# Patient Record
Sex: FEMALE | Race: WHITE | NOT HISPANIC OR LATINO | Employment: OTHER | URBAN - METROPOLITAN AREA
[De-identification: names, ages, dates, MRNs, and addresses within clinical notes are randomized per-mention and may not be internally consistent; named-entity substitution may affect disease eponyms.]

---

## 2017-03-02 DIAGNOSIS — IMO0002 UNCONTROLLED TYPE 2 DIABETES MELLITUS WITH STAGE 3 CHRONIC KIDNEY DISEASE, WITH LONG-TERM CURRENT USE OF INSULIN: Primary | ICD-10-CM

## 2017-03-15 ENCOUNTER — OFFICE VISIT (OUTPATIENT)
Dept: ENDOCRINOLOGY | Facility: CLINIC | Age: 62
End: 2017-03-15

## 2017-03-15 VITALS
HEIGHT: 63 IN | WEIGHT: 266.2 LBS | HEART RATE: 82 BPM | DIASTOLIC BLOOD PRESSURE: 82 MMHG | BODY MASS INDEX: 47.17 KG/M2 | OXYGEN SATURATION: 100 % | SYSTOLIC BLOOD PRESSURE: 130 MMHG

## 2017-03-15 DIAGNOSIS — IMO0002 UNCONTROLLED TYPE 2 DIABETES MELLITUS WITH STAGE 3 CHRONIC KIDNEY DISEASE, WITH LONG-TERM CURRENT USE OF INSULIN: Primary | ICD-10-CM

## 2017-03-15 DIAGNOSIS — E11.43 GASTROPARESIS DUE TO DM (HCC): ICD-10-CM

## 2017-03-15 DIAGNOSIS — K31.84 GASTROPARESIS DUE TO DM (HCC): ICD-10-CM

## 2017-03-15 DIAGNOSIS — I10 ESSENTIAL HYPERTENSION: ICD-10-CM

## 2017-03-15 DIAGNOSIS — E78.2 MIXED HYPERLIPIDEMIA: ICD-10-CM

## 2017-03-15 LAB
GLUCOSE BLDC GLUCOMTR-MCNC: 132 MG/DL (ref 70–130)
HBA1C MFR BLD: 9.9 %

## 2017-03-15 PROCEDURE — 83036 HEMOGLOBIN GLYCOSYLATED A1C: CPT | Performed by: INTERNAL MEDICINE

## 2017-03-15 PROCEDURE — 99214 OFFICE O/P EST MOD 30 MIN: CPT | Performed by: INTERNAL MEDICINE

## 2017-03-15 PROCEDURE — 82947 ASSAY GLUCOSE BLOOD QUANT: CPT | Performed by: INTERNAL MEDICINE

## 2017-03-15 NOTE — PROGRESS NOTES
Chief Complaint   Patient presents with   • Diabetes     Follow Up         HPI:   Masha Forde is a 62 y.o.female who returns to [facility] for f/u evaluation of her Type 2 DM. Last clinic visit 12/19/2016. Her history is as follows:    Interim Events: pt's diagnosis of gastroparesis confirmed by Dr. Maryuri Mauricio ([facility]). Was prescribed Domperidone TID, but just started medication yesterday due to difficulties obtaining medication from [facility].    1) Type 2 diabetes with associated complications of CKD, gastroparesis:  - diagnosed with DM in her mid 40's     Current DM Medications:  - Novolog 70/30: 40 units ac BK and 40 units ac dinner  - Metformin  mg BID (higher dose caused diarrhea)    Glucometer Review: pt forgot to bring meter today  She reports:  220-240 in Am  260 - 300 in PM.    DM Health Maintenance:  Ophtho: Last visit - 9/2016 - normal  Podiatry: Last visit - none  Monofilament: normal in 2016  Lipids: 2016 - need to obtain from her PCP office, on lipitor 10 mg daily  Urine Microalb/Cr ratio: has CKD stage 3, on an ARB  Aspirin: N/A    Review of Systems   Constitutional: Positive for fatigue.        Weight gain   HENT: Negative.    Eyes: Negative.  Negative for visual disturbance.   Respiratory: Positive for shortness of breath (on exertion).    Cardiovascular: Positive for leg swelling. Negative for chest pain.        Bilateral venous stasis to lower extremities   Gastrointestinal: Positive for nausea. Negative for constipation and diarrhea.   Endocrine: Negative.  Negative for cold intolerance, polydipsia and polyuria.   Genitourinary: Negative.    Musculoskeletal: Negative.    Skin: Negative.  Negative for rash and wound.   Neurological: Negative.  Negative for numbness and headaches.   Hematological: Negative.    Psychiatric/Behavioral:        Anxiety     The following portions of the patient's history were reviewed and updated as appropriate: allergies, current medications, past  "family history, past medical history, past social history, past surgical history and problem list.    Visit Vitals   • /82   • Pulse 82   • Ht 63\" (160 cm)   • Wt 266 lb 3.2 oz (121 kg)   • SpO2 100%   • BMI 47.16 kg/m2     Physical Exam   Constitutional: She is oriented to person, place, and time. She appears well-developed. No distress.   obese   HENT:   Head: Normocephalic.   Mouth/Throat: Oropharynx is clear and moist.   Eyes: Conjunctivae and EOM are normal. Pupils are equal, round, and reactive to light.   Neck: No tracheal deviation present. No thyromegaly present.   No palpable thyroid nodules     Cardiovascular: Normal rate, regular rhythm and normal heart sounds.    No murmur heard.  Pulmonary/Chest: Effort normal and breath sounds normal. No respiratory distress.   Abdominal: Soft. Bowel sounds are normal.   Lymphadenopathy:     She has no cervical adenopathy.   Neurological: She is alert and oriented to person, place, and time. No cranial nerve deficit.   Skin: Skin is warm and dry. She is not diaphoretic. No erythema.   Psychiatric: She has a normal mood and affect. Her behavior is normal.   Vitals reviewed.    LABS/IMAGING:   Results for orders placed or performed in visit on 03/15/17   POC Glucose Fingerstick   Result Value Ref Range    Glucose 132 (A) 70 - 130 mg/dL   POC Glycosylated Hemoglobin (Hb A1C)   Result Value Ref Range    Hemoglobin A1C 9.9 %     ASSESSMENT/PLAN:  1) Type 2 Diabetes with associated complication of CKD stage 3: uncontrolled, Hgb A1C% 9.9   - continue metformin  mg BID in setting of mod CKD. (new FDA recommendation)  - will increase Novolog 70/30 premixed insulin to 50 units AC BK and ac dinner. Pt to titrate slowly until pre-meal BGs are < 150. Written instructions given to patient.  - pt to email BG log in 2 weeks    2) gastroparesis due to DM:   - managed by Dr. Maryuri Mauricio, [facility]  - on domperidone TID    3) essential HTN: controlled  - managed by PCP   - " on valsartan 40 mg daily    4) mixed hyperlipidemia:  - managed by PCP  - on lipitor 10 mg daily    Will obtain recent labs from PCP office for review    RTC 3 months

## 2017-03-19 PROBLEM — K31.84 GASTROPARESIS DUE TO DM (HCC): Status: ACTIVE | Noted: 2017-03-19

## 2017-03-19 PROBLEM — E11.43 GASTROPARESIS DUE TO DM (HCC): Status: ACTIVE | Noted: 2017-03-19

## 2017-03-19 RX ORDER — METFORMIN HYDROCHLORIDE 500 MG/1
500 TABLET, EXTENDED RELEASE ORAL 2 TIMES DAILY
Qty: 180 TABLET | Refills: 3 | Status: SHIPPED | OUTPATIENT
Start: 2017-03-19 | End: 2018-04-16 | Stop reason: SDUPTHER

## 2017-03-19 RX ORDER — INSULIN ASPART 100 [IU]/ML
INJECTION, SUSPENSION SUBCUTANEOUS
Qty: 35 PEN | Refills: 3 | Status: SHIPPED | OUTPATIENT
Start: 2017-03-19 | End: 2017-07-19 | Stop reason: SDUPTHER

## 2017-07-19 ENCOUNTER — OFFICE VISIT (OUTPATIENT)
Dept: ENDOCRINOLOGY | Facility: CLINIC | Age: 62
End: 2017-07-19

## 2017-07-19 VITALS
SYSTOLIC BLOOD PRESSURE: 128 MMHG | OXYGEN SATURATION: 98 % | HEART RATE: 86 BPM | WEIGHT: 285.1 LBS | HEIGHT: 63 IN | DIASTOLIC BLOOD PRESSURE: 80 MMHG | BODY MASS INDEX: 50.52 KG/M2

## 2017-07-19 DIAGNOSIS — E11.43 GASTROPARESIS DUE TO DM (HCC): ICD-10-CM

## 2017-07-19 DIAGNOSIS — E78.2 MIXED HYPERLIPIDEMIA: ICD-10-CM

## 2017-07-19 DIAGNOSIS — K31.84 GASTROPARESIS DUE TO DM (HCC): ICD-10-CM

## 2017-07-19 DIAGNOSIS — I10 ESSENTIAL HYPERTENSION: ICD-10-CM

## 2017-07-19 DIAGNOSIS — IMO0002 UNCONTROLLED TYPE 2 DIABETES MELLITUS WITH STAGE 3 CHRONIC KIDNEY DISEASE, WITH LONG-TERM CURRENT USE OF INSULIN: Primary | ICD-10-CM

## 2017-07-19 LAB
GLUCOSE BLDC GLUCOMTR-MCNC: 184 MG/DL (ref 70–130)
HBA1C MFR BLD: 7.8 %

## 2017-07-19 PROCEDURE — 82947 ASSAY GLUCOSE BLOOD QUANT: CPT | Performed by: INTERNAL MEDICINE

## 2017-07-19 PROCEDURE — 99214 OFFICE O/P EST MOD 30 MIN: CPT | Performed by: INTERNAL MEDICINE

## 2017-07-19 PROCEDURE — 83036 HEMOGLOBIN GLYCOSYLATED A1C: CPT | Performed by: INTERNAL MEDICINE

## 2017-07-19 RX ORDER — INSULIN ASPART 100 [IU]/ML
INJECTION, SUSPENSION SUBCUTANEOUS
Qty: 35 PEN | Refills: 3 | Status: SHIPPED | OUTPATIENT
Start: 2017-07-19 | End: 2017-11-20 | Stop reason: SDUPTHER

## 2017-07-19 NOTE — PROGRESS NOTES
Chief Complaint   Patient presents with   • Diabetes     Follow UP       HPI:   Masha Forde is a 62 y.o.female who returns to [facility] for f/u evaluation of her Type 2 DM. Last clinic visit 03/15/2017. Her history is as follows:    Interim Events:   - having more hypoglycemia after breakfast while at work. Tends to eat a lower carb/light breakfast and a larger lunch. Having AM BGs < 100 frequently  - stopped domperidone due to fear of side effect.     1) Type 2 diabetes with associated complications of CKD, gastroparesis:  - diagnosed with DM in her mid 40's     Current DM Medications:  - Novolog 70/30: 58 units ac BK and 58 units ac dinner  - Metformin  mg BID (higher dose caused diarrhea)    Glucometer Review:   AM BGs: 80's - 180's  PM BGs (taken after meals): 100's -200's    DM Health Maintenance:  Ophtho: Last visit - 9/2016 - normal  Podiatry: Last visit - none  Monofilament: normal in 2016  Lipids: due 2017, on lipitor 10 mg daily  Urine Microalb/Cr ratio: N/A has CKD stage 3, on an ARB  Aspirin: N/A    Review of Systems   Constitutional:        Weight gain   HENT: Negative.    Eyes: Negative.  Negative for visual disturbance.   Respiratory: Positive for shortness of breath (on exertion).    Cardiovascular: Positive for leg swelling. Negative for chest pain.        Bilateral venous stasis to lower extremities   Gastrointestinal: Positive for nausea. Negative for constipation, diarrhea and vomiting.   Endocrine: Negative.  Negative for cold intolerance, polydipsia and polyuria.   Genitourinary: Negative.    Musculoskeletal:        Small, nontender nodules present in distal pats of her fingers due to her GA   Skin: Negative.  Negative for rash and wound.   Neurological: Negative.  Negative for numbness and headaches.   Hematological: Negative.    Psychiatric/Behavioral:        H/o Anxiety     The following portions of the patient's history were reviewed and updated as appropriate: allergies,  "current medications, past family history, past medical history, past social history, past surgical history and problem list.    /80  Pulse 86  Ht 63\" (160 cm)  Wt 285 lb 1.6 oz (129 kg)  SpO2 98%  BMI 50.5 kg/m2  Physical Exam   Constitutional: She is oriented to person, place, and time. She appears well-developed. No distress.   obese   HENT:   Head: Normocephalic.   Eyes: Conjunctivae and EOM are normal. Pupils are equal, round, and reactive to light.   Neck: No tracheal deviation present. No thyromegaly present.   No palpable thyroid nodules     Cardiovascular: Normal rate, regular rhythm and normal heart sounds.    No murmur heard.  Pulmonary/Chest: Effort normal and breath sounds normal. No respiratory distress.   Abdominal: Soft. Bowel sounds are normal.   No scarring at insulin injection sites     Musculoskeletal: She exhibits edema (+1 lower extremity edema).   Lymphadenopathy:     She has no cervical adenopathy.   Neurological: She is alert and oriented to person, place, and time. No cranial nerve deficit.   Skin: Skin is warm and dry. She is not diaphoretic. No erythema.   Psychiatric: She has a normal mood and affect. Her behavior is normal.   Vitals reviewed.    LABS/IMAGING:   Results for orders placed or performed in visit on 07/19/17   POC Glucose Fingerstick   Result Value Ref Range    Glucose 184 (A) 70 - 130 mg/dL   POC Glycosylated Hemoglobin (Hb A1C)   Result Value Ref Range    Hemoglobin A1C 7.8 %     ASSESSMENT/PLAN:  1) Type 2 Diabetes with associated complication of CKD stage 3, gastroparesis: uncontrolled, but overall much improved. Hgb A1C% 7.8 down from 9.9 last visit     - continue metformin  mg BID in setting of mod CKD. (d/w pt okay to use per new FDA recommendation)  - due to her episodes of post-breakfast hypoglycemia, will try a TID dosing of the Novolog 70/30. Will also decrease her dinner dose due to AM hypoglycemia. Also asked pt to try taking insulin after her " meals due to her gastroparesis  - Novolog 70/30: 20 units with breakfast, 30 units with lunch, 54 units with dinner    - pt to email BG log in 1 week    2) gastroparesis due to DM:   - managed by Dr. Maryuri Mauricio, [facility]  - no longer on domperidone TID due to fear of side effects  - pt has to schedule f/u appt    3) essential HTN: controlled  - managed by PCP   - on valsartan 40 mg daily    4) mixed hyperlipidemia:  - managed by PCP  - on lipitor 10 mg daily    Labs (CMP, CBC, Lipid panel, Urine microalbumin/Cr ratio next visit if not already completed at her PCP office)     RTC 3-4 months

## 2017-07-20 DIAGNOSIS — IMO0002 UNCONTROLLED TYPE 2 DIABETES MELLITUS WITH STAGE 3 CHRONIC KIDNEY DISEASE, WITH LONG-TERM CURRENT USE OF INSULIN: Primary | ICD-10-CM

## 2017-07-20 RX ORDER — BLOOD-GLUCOSE METER
EACH MISCELLANEOUS
Qty: 1 KIT | Refills: 0 | Status: SHIPPED | OUTPATIENT
Start: 2017-07-20

## 2017-07-20 RX ORDER — LANCETS
EACH MISCELLANEOUS
Qty: 100 EACH | Refills: 5 | Status: SHIPPED | OUTPATIENT
Start: 2017-07-20 | End: 2018-09-12 | Stop reason: SDUPTHER

## 2017-07-20 NOTE — TELEPHONE ENCOUNTER
FAX FROM PHARMACY STATING THAT CONTOUR NEXT TEST STRIPS ARE NO LONGER COVERED BY INSURANCE AND ACCU-CHEK IS THE PREFERRED METER.  CHANGED AND SENT TO PHARMACY

## 2017-10-23 ENCOUNTER — TRANSCRIBE ORDERS (OUTPATIENT)
Dept: ADMINISTRATIVE | Facility: HOSPITAL | Age: 62
End: 2017-10-23

## 2017-10-23 DIAGNOSIS — Z12.31 VISIT FOR SCREENING MAMMOGRAM: Primary | ICD-10-CM

## 2017-11-20 ENCOUNTER — OFFICE VISIT (OUTPATIENT)
Dept: ENDOCRINOLOGY | Facility: CLINIC | Age: 62
End: 2017-11-20

## 2017-11-20 VITALS
OXYGEN SATURATION: 98 % | WEIGHT: 285 LBS | SYSTOLIC BLOOD PRESSURE: 126 MMHG | DIASTOLIC BLOOD PRESSURE: 76 MMHG | HEIGHT: 63 IN | HEART RATE: 83 BPM | BODY MASS INDEX: 50.5 KG/M2

## 2017-11-20 DIAGNOSIS — E11.43 GASTROPARESIS DUE TO DM (HCC): ICD-10-CM

## 2017-11-20 DIAGNOSIS — IMO0002 UNCONTROLLED TYPE 2 DIABETES MELLITUS WITH STAGE 3 CHRONIC KIDNEY DISEASE, WITH LONG-TERM CURRENT USE OF INSULIN: Primary | ICD-10-CM

## 2017-11-20 DIAGNOSIS — E78.2 MIXED HYPERLIPIDEMIA: ICD-10-CM

## 2017-11-20 DIAGNOSIS — IMO0002 UNCONTROLLED TYPE 2 DIABETES MELLITUS WITH STAGE 3 CHRONIC KIDNEY DISEASE, WITH LONG-TERM CURRENT USE OF INSULIN: ICD-10-CM

## 2017-11-20 DIAGNOSIS — I10 ESSENTIAL HYPERTENSION: ICD-10-CM

## 2017-11-20 DIAGNOSIS — K31.84 GASTROPARESIS DUE TO DM (HCC): ICD-10-CM

## 2017-11-20 LAB
GLUCOSE BLDC GLUCOMTR-MCNC: 136 MG/DL (ref 70–130)
HBA1C MFR BLD: 7.7 %

## 2017-11-20 PROCEDURE — 82947 ASSAY GLUCOSE BLOOD QUANT: CPT | Performed by: INTERNAL MEDICINE

## 2017-11-20 PROCEDURE — 99214 OFFICE O/P EST MOD 30 MIN: CPT | Performed by: INTERNAL MEDICINE

## 2017-11-20 PROCEDURE — 83036 HEMOGLOBIN GLYCOSYLATED A1C: CPT | Performed by: INTERNAL MEDICINE

## 2017-11-20 NOTE — PROGRESS NOTES
Chief Complaint   Patient presents with   • Diabetes     Follow Up        HPI:   Masha Forde is a 62 y.o.female who returns to [facility] for f/u evaluation of her Type 2 DM. Last clinic visit 2017. Her history is as follows:    Interim Events:   - pt's sister  of lung cancer 2017. Therefore has not been able to focus on her diabetes care  - still having intermittent hypoglycemia after breakfast while at work. Tends to eat a lower carb/light breakfast and a larger lunch.   - having diarrhea but states not due to metformin     1) Type 2 diabetes with associated complications of CKD, gastroparesis:  - diagnosed with DM in her mid 40's     Current DM Medications:  - Novolog 70/30: 20 units ac BK, 30 units ac lunch, 54 units ac dinner  - Metformin  mg BID (higher dose caused diarrhea)    Glucometer Review: not checking BG as often.  AM BGs: 80's - 180's  Very few PM BGs (taken pre- and post- meals: 100's -200's    DM Health Maintenance:  Ophtho: Last visit - 2016 - normal  Podiatry: Last visit - none  Monofilament / foot exam: 2017  Lipids: due 2018, on lipitor 10 mg daily  Urine Microalb/Cr ratio: has CKD stage 3, on an ARB  Aspirin: N/A    Review of Systems   Constitutional:        Weight stable   HENT: Negative.    Eyes: Negative.  Negative for visual disturbance.   Respiratory: Positive for shortness of breath (on exertion).    Cardiovascular: Positive for leg swelling. Negative for chest pain.        Bilateral venous stasis to lower extremities   Gastrointestinal: Positive for nausea (intermittent). Negative for constipation, diarrhea and vomiting.   Endocrine: Negative.  Negative for cold intolerance, polydipsia and polyuria.   Genitourinary: Negative.    Musculoskeletal:        Small, nontender nodules present in distal pats of her fingers due to her GA   Skin: Negative.  Negative for rash and wound.   Neurological: Negative.  Negative for numbness and headaches.  "  Hematological: Negative.    Psychiatric/Behavioral:        H/o Anxiety  depression     The following portions of the patient's history were reviewed and updated as appropriate: allergies, current medications, past family history, past medical history, past social history, past surgical history and problem list.    /76  Pulse 83  Ht 63\" (160 cm)  Wt 285 lb (129 kg)  SpO2 98%  BMI 50.49 kg/m2  Physical Exam   Constitutional: She is oriented to person, place, and time. She appears well-developed. No distress.   obese   HENT:   Head: Normocephalic.   Eyes: Conjunctivae and EOM are normal. Pupils are equal, round, and reactive to light.   Neck: No tracheal deviation present. No thyromegaly present.   No palpable thyroid nodules     Cardiovascular: Normal rate, regular rhythm and normal heart sounds.    No murmur heard.  Pulmonary/Chest: Effort normal and breath sounds normal. No respiratory distress.   Abdominal: Soft. Bowel sounds are normal.   No scarring at insulin injection sites     Musculoskeletal: She exhibits edema (+1 lower extremity edema).    Masha had a diabetic foot exam performed today.   During the foot exam she had a monofilament test performed (normal sensation bilaterally).    Vascular Status -  Her exam exhibits right foot vasculature normal. Her exam exhibits no right foot edema. Her exam exhibits left foot vasculature normal. Her exam exhibits no left foot edema.   Skin Integrity  -  Her right foot skin is intact.  She has callous right foot and right heel is dry and cracked.  She has no right foot onychomycosis and no right foot ulcer.    Masha 's left foot skin is intact. She has callous left foot and left heel dry and cracked. She has no left foot onychomycosis and no left foot ulcer..  Lymphadenopathy:     She has no cervical adenopathy.   Neurological: She is alert and oriented to person, place, and time. No cranial nerve deficit.   Skin: Skin is warm and dry. She is not " diaphoretic. No erythema.   Psychiatric: She has a normal mood and affect. Her behavior is normal.   Vitals reviewed.    LABS/IMAGING:   Results for orders placed or performed in visit on 11/20/17   POC Glucose Fingerstick   Result Value Ref Range    Glucose 136 (A) 70 - 130 mg/dL   POC Glycosylated Hemoglobin (Hb A1C)   Result Value Ref Range    Hemoglobin A1C 7.7 %     ASSESSMENT/PLAN:  1) Type 2 Diabetes with associated complication of CKD stage 3, gastroparesis: uncontrolled, but overall much improved. Hgb A1C% 7.7 today down from 9.9 in past visits     - continue metformin  mg BID in setting of mod CKD. (d/w pt okay to use per new FDA recommendation)  - due to her episodes of post-breakfast hypoglycemia, will continue a TID dosing of the Novolog 70/30.   - change Novolog 70/30: 16 units with breakfast, 30 units with lunch, 54 units with dinner. Pt to take 4 extra units if eating a restaurant meal.    - pt to email BG log in 1-2 week    2) gastroparesis due to DM:   - managed by Dr. Maryuri Mauricio, [facility]  - no longer on domperidone TID due to fear of side effects  - pt has to schedule f/u appt    3) essential HTN: controlled  - managed by PCP   - on valsartan 40 mg daily    4) mixed hyperlipidemia:  - managed by PCP  - on lipitor 10 mg daily    Labs (CMP, CBC, Lipid panel, Urine microalbumin/Cr ratio) are going to be completed at her PCP office    RTC 3-4 months

## 2017-11-21 RX ORDER — INSULIN ASPART 100 [IU]/ML
INJECTION, SUSPENSION SUBCUTANEOUS
Qty: 30 PEN | Refills: 3 | Status: SHIPPED | OUTPATIENT
Start: 2017-11-21 | End: 2018-04-16 | Stop reason: SDUPTHER

## 2018-02-05 ENCOUNTER — TRANSCRIBE ORDERS (OUTPATIENT)
Dept: ADMINISTRATIVE | Facility: HOSPITAL | Age: 63
End: 2018-02-05

## 2018-02-05 DIAGNOSIS — Z12.31 VISIT FOR SCREENING MAMMOGRAM: Primary | ICD-10-CM

## 2018-03-07 DIAGNOSIS — IMO0002 UNCONTROLLED TYPE 2 DIABETES MELLITUS WITH STAGE 3 CHRONIC KIDNEY DISEASE, WITH LONG-TERM CURRENT USE OF INSULIN: ICD-10-CM

## 2018-03-07 RX ORDER — PEN NEEDLE, DIABETIC 32GX 5/32"
NEEDLE, DISPOSABLE MISCELLANEOUS
Qty: 100 EACH | Refills: 2 | Status: SHIPPED | OUTPATIENT
Start: 2018-03-07 | End: 2018-04-16 | Stop reason: SDUPTHER

## 2018-03-12 ENCOUNTER — APPOINTMENT (OUTPATIENT)
Dept: MAMMOGRAPHY | Facility: HOSPITAL | Age: 63
End: 2018-03-12
Attending: FAMILY MEDICINE

## 2018-04-16 ENCOUNTER — OFFICE VISIT (OUTPATIENT)
Dept: ENDOCRINOLOGY | Facility: CLINIC | Age: 63
End: 2018-04-16

## 2018-04-16 ENCOUNTER — HOSPITAL ENCOUNTER (OUTPATIENT)
Dept: MAMMOGRAPHY | Facility: HOSPITAL | Age: 63
Discharge: HOME | End: 2018-04-16
Attending: FAMILY MEDICINE | Admitting: FAMILY MEDICINE

## 2018-04-16 VITALS
BODY MASS INDEX: 51.49 KG/M2 | HEIGHT: 63 IN | WEIGHT: 290.6 LBS | DIASTOLIC BLOOD PRESSURE: 80 MMHG | SYSTOLIC BLOOD PRESSURE: 124 MMHG

## 2018-04-16 DIAGNOSIS — E11.22 TYPE 2 DIABETES MELLITUS WITH STAGE 3 CHRONIC KIDNEY DISEASE, WITH LONG-TERM CURRENT USE OF INSULIN (HCC): Primary | ICD-10-CM

## 2018-04-16 DIAGNOSIS — Z12.31 VISIT FOR SCREENING MAMMOGRAM: ICD-10-CM

## 2018-04-16 DIAGNOSIS — Z79.4 TYPE 2 DIABETES MELLITUS WITH STAGE 3 CHRONIC KIDNEY DISEASE, WITH LONG-TERM CURRENT USE OF INSULIN (HCC): Primary | ICD-10-CM

## 2018-04-16 DIAGNOSIS — E78.2 MIXED HYPERLIPIDEMIA: ICD-10-CM

## 2018-04-16 DIAGNOSIS — N18.30 TYPE 2 DIABETES MELLITUS WITH STAGE 3 CHRONIC KIDNEY DISEASE, WITH LONG-TERM CURRENT USE OF INSULIN (HCC): Primary | ICD-10-CM

## 2018-04-16 LAB
GLUCOSE BLDC GLUCOMTR-MCNC: 154 MG/DL (ref 70–130)
HBA1C MFR BLD: 7.6 %

## 2018-04-16 PROCEDURE — 77067 SCR MAMMO BI INCL CAD: CPT

## 2018-04-16 PROCEDURE — 83036 HEMOGLOBIN GLYCOSYLATED A1C: CPT | Performed by: INTERNAL MEDICINE

## 2018-04-16 PROCEDURE — 99214 OFFICE O/P EST MOD 30 MIN: CPT | Performed by: INTERNAL MEDICINE

## 2018-04-16 PROCEDURE — 77067 SCR MAMMO BI INCL CAD: CPT | Performed by: RADIOLOGY

## 2018-04-16 PROCEDURE — 82947 ASSAY GLUCOSE BLOOD QUANT: CPT | Performed by: INTERNAL MEDICINE

## 2018-04-16 PROCEDURE — 77063 BREAST TOMOSYNTHESIS BI: CPT | Performed by: RADIOLOGY

## 2018-04-16 PROCEDURE — 77063 BREAST TOMOSYNTHESIS BI: CPT

## 2018-04-16 RX ORDER — METFORMIN HYDROCHLORIDE 500 MG/1
500 TABLET, EXTENDED RELEASE ORAL 2 TIMES DAILY WITH MEALS
Qty: 180 TABLET | Refills: 3 | Status: SHIPPED | OUTPATIENT
Start: 2018-04-16 | End: 2018-08-06 | Stop reason: SDUPTHER

## 2018-04-16 RX ORDER — ATORVASTATIN CALCIUM 10 MG/1
10 TABLET, FILM COATED ORAL DAILY
Qty: 90 TABLET | Refills: 3 | Status: SHIPPED | OUTPATIENT
Start: 2018-04-16 | End: 2018-08-06 | Stop reason: SDUPTHER

## 2018-04-16 RX ORDER — INSULIN ASPART 100 [IU]/ML
INJECTION, SUSPENSION SUBCUTANEOUS
Qty: 30 PEN | Refills: 3 | Status: SHIPPED | OUTPATIENT
Start: 2018-04-16 | End: 2018-08-06 | Stop reason: SDUPTHER

## 2018-04-16 RX ORDER — BLOOD SUGAR DIAGNOSTIC
STRIP MISCELLANEOUS
Qty: 100 EACH | Refills: 11 | Status: SHIPPED | OUTPATIENT
Start: 2018-04-16 | End: 2019-11-20 | Stop reason: SDUPTHER

## 2018-04-16 NOTE — PROGRESS NOTES
Chief Complaint   Patient presents with   • Diabetes     F/u for type 2 diabetes, C/o fluctuations in blood sugar numbers       HPI:   Masha Forde is a 63 y.o.female who returns to [facility] for f/u evaluation of her Type 2 DM. Last clinic visit 11/20/2017. Her history is as follows:    Interim Events:   - still having intermittent hypoglycemia after breakfast while at work. Tends to eat a lower carb/light breakfast and a larger lunch.     1) Type 2 diabetes with associated complications of CKD, gastroparesis:  - diagnosed with DM in her mid 40's     Current DM Medications:  - Novolog 70/30: 16 units ac BK, 30 units ac lunch, 54 units ac dinner  - Metformin  mg BID (higher dose caused diarrhea)    Glucometer Review: checking BG 3 times a day  Majority of pre-meal BGs have been <150   Midday, after BK hypoglycemia in the 60's noted on several occasions    DM Health Maintenance:  Ophtho: Last visit - 9/2016 - normal  Podiatry: Last visit - none  Monofilament / foot exam: 11/2017  Lipids: due 2018, on lipitor 10 mg daily  Urine Microalb/Cr ratio: has CKD stage 3, on an ARB  Aspirin: N/A    Review of Systems   Constitutional:        Weight stable   HENT: Negative.    Eyes: Negative.  Negative for visual disturbance.   Respiratory: Positive for shortness of breath (on exertion).    Cardiovascular: Positive for leg swelling. Negative for chest pain.        Bilateral venous stasis to lower extremities   Gastrointestinal: Positive for nausea (intermittent). Negative for constipation, diarrhea and vomiting.   Endocrine: Negative.  Negative for cold intolerance, polydipsia and polyuria.   Genitourinary: Negative.    Musculoskeletal:        Small, nontender nodules present in distal pats of her fingers due to her GA   Skin: Negative.  Negative for rash and wound.   Neurological: Negative.  Negative for numbness and headaches.   Hematological: Negative.    Psychiatric/Behavioral:        H/o Anxiety  depression  "    The following portions of the patient's history were reviewed and updated as appropriate: allergies, current medications, past family history, past medical history, past social history, past surgical history and problem list.    /80   Ht 160 cm (63\")   Wt 132 kg (290 lb 9.6 oz)   BMI 51.48 kg/m²   Physical Exam   Constitutional: She is oriented to person, place, and time. She appears well-developed. No distress.   obese   HENT:   Head: Normocephalic.   Eyes: Conjunctivae and EOM are normal. Pupils are equal, round, and reactive to light.   Neck: No tracheal deviation present. No thyromegaly present.   No palpable thyroid nodules     Cardiovascular: Normal rate, regular rhythm and normal heart sounds.    No murmur heard.  Pulmonary/Chest: Effort normal and breath sounds normal. No respiratory distress.   Abdominal: Soft. Bowel sounds are normal.   No scarring at insulin injection sites     Musculoskeletal: She exhibits edema (+1 lower extremity edema).   Lymphadenopathy:     She has no cervical adenopathy.   Neurological: She is alert and oriented to person, place, and time. No cranial nerve deficit.   Skin: Skin is dry. She is not diaphoretic. No erythema.   Pt noted to have Raynaud's phenomenon in her fingers at the beginning of her visit. Improved after hand warming   Psychiatric: She has a normal mood and affect. Her behavior is normal.   Vitals reviewed.    LABS/IMAGING:   Results for orders placed or performed in visit on 04/16/18   POC Glucose Fingerstick   Result Value Ref Range    Glucose 154 (A) 70 - 130 mg/dL   POC Glycosylated Hemoglobin (Hb A1C)   Result Value Ref Range    Hemoglobin A1C 7.6 %     ASSESSMENT/PLAN:  1) Type 2 Diabetes with associated complication of CKD stage 3, gastroparesis: fair control, overall much improved. Hgb A1C% 7.6 today down from 9.9 in past visits     - continue metformin  mg BID in setting of mod CKD. (d/w pt okay to use per new FDA recommendation)  - due " to her episodes of post-breakfast hypoglycemia, will continue a TID dosing of the Novolog 70/30.   - change Novolog 70/30: 10 units with breakfast, 30 units with lunch, 54 units with dinner. Pt to take 4 extra units if eating a restaurant meal.    - pt to email BG log in 1-2 week    2) gastroparesis due to DM:   - has not followed up with Dr. Maryuri Mauricio, [facility] as her symptoms are stable with dietary changes  - not on medication at this time     3) essential HTN: controlled  - managed by PCP   - on valsartan 40 mg daily    4) mixed hyperlipidemia:  - managed by PCP  - on lipitor 10 mg daily    Labs (CMP, CBC, Lipid panel, Urine microalbumin/Cr ratio) were completed at her PCP's office. Will obtain copies.    RTC 4 months

## 2018-04-23 ENCOUNTER — TRANSCRIBE ORDERS (OUTPATIENT)
Dept: ADMINISTRATIVE | Facility: HOSPITAL | Age: 63
End: 2018-04-23

## 2018-04-23 ENCOUNTER — HOSPITAL ENCOUNTER (OUTPATIENT)
Dept: GENERAL RADIOLOGY | Facility: HOSPITAL | Age: 63
Discharge: HOME | End: 2018-04-23
Attending: FAMILY MEDICINE | Admitting: FAMILY MEDICINE

## 2018-04-23 DIAGNOSIS — M25.562 LEFT KNEE PAIN, UNSPECIFIED CHRONICITY: Primary | ICD-10-CM

## 2018-04-23 DIAGNOSIS — M25.562 LEFT KNEE PAIN, UNSPECIFIED CHRONICITY: ICD-10-CM

## 2018-04-23 PROCEDURE — 73562 X-RAY EXAM OF KNEE 3: CPT

## 2018-04-25 PROBLEM — I73.00 RAYNAUD PHENOMENON: Status: ACTIVE | Noted: 2018-04-25

## 2018-05-01 ENCOUNTER — OFFICE VISIT (OUTPATIENT)
Dept: ORTHOPEDIC SURGERY | Facility: CLINIC | Age: 63
End: 2018-05-01

## 2018-05-01 VITALS
HEART RATE: 93 BPM | SYSTOLIC BLOOD PRESSURE: 187 MMHG | BODY MASS INDEX: 51.09 KG/M2 | HEIGHT: 63 IN | DIASTOLIC BLOOD PRESSURE: 93 MMHG | WEIGHT: 288.36 LBS

## 2018-05-01 DIAGNOSIS — M25.562 ACUTE PAIN OF LEFT KNEE: Primary | ICD-10-CM

## 2018-05-01 PROCEDURE — 99244 OFF/OP CNSLTJ NEW/EST MOD 40: CPT | Performed by: ORTHOPAEDIC SURGERY

## 2018-05-01 NOTE — PROGRESS NOTES
Orthopaedic Clinic Note: Knee New Patient    Chief Complaint   Patient presents with   • Left Knee - Pain        HPI    Consult from: Danae Willingham MD    Masha Forde is a 63 y.o. female who presents with left knee pain for 3 week(s). Onset Began after she missed a step off of a stepladder and landed backwards on her left knee.  She stated that the misstep resulted in axial load in the knee and since then she is had pain localized globally throughout the knee which she now rates a 7/10 on the pain scale.  She states her pain is worse with standing, prolonged sitting, weightbearing and knee range of motion activities.  Lying down and elevating the extremity sometimes help the symptoms.  She denies any ongoing mechanical symptoms.  She denies any significant swelling after the initial injury.  She has been taking occasional Aleve for pain control.  Despite these interventions, her pain is persisting.  She is ambulating with no assistive device today.    Past Medical History:   Diagnosis Date   • Cholelithiasis    • Diabetes mellitus    • GERD (gastroesophageal reflux disease)    • Granuloma annulare     hands, s/p multiple lesion removal   • Hiatal hernia    • Hyperlipidemia    • Hypertension       Past Surgical History:   Procedure Laterality Date   • BREAST CYST EXCISION     • CHOLECYSTECTOMY     • COLONOSCOPY  2007   • HAND SURGERY     • HYSTERECTOMY      partial   • KIDNEY STONE SURGERY  2016      Family History   Problem Relation Age of Onset   • Diabetes Mother    • COPD Mother    • Alzheimer's disease Mother    • Diabetes Father    • Heart attack Father    • Coronary artery disease Father    • Obesity Father    • Diabetes Sister    • Obesity Sister    • Diabetes Paternal Grandmother    • Lung cancer Sister      Social History     Social History   • Marital status:      Spouse name: N/A   • Number of children: N/A   • Years of education: N/A     Occupational History   •  [employer]     Social History Main Topics   • Smoking status: Never Smoker   • Smokeless tobacco: Never Used   • Alcohol use No   • Drug use: No   • Sexual activity: Defer      Comment: lives with      Other Topics Concern   • Not on file     Social History Narrative   • No narrative on file      Current Outpatient Prescriptions on File Prior to Visit   Medication Sig Dispense Refill   • ACCU-CHEK CINDY PLUS test strip TEST THREE TIMES DAILY 100 each 11   • atorvastatin (LIPITOR) 10 MG tablet Take 1 tablet by mouth Daily. 90 tablet 3   • Blood Glucose Monitoring Suppl (ACCU-CHEK CINDY PLUS) W/DEVICE kit TEST THREE TIMES DAILY 1 kit 0   • clobetasol (TEMOVATE) 0.05 % cream Apply 1 application topically 2 (two) times a day.     • Insulin Pen Needle (BD PEN NEEDLE BYRON U/F) 32G X 4 MM misc 1 each by Other route 3 (Three) Times a Day. 300 each 3   • Insulin Pen Needle (BD PEN NEEDLE BYRON U/F) 32G X 4 MM misc Inject 1 each under the skin 3 (Three) Times a Day Before Meals. 300 each 3   • Lancets (ACCU-CHEK MULTICLIX) lancets TEST THREE TIMES DAILY 100 each 5   • metFORMIN ER (GLUCOPHAGE-XR) 500 MG 24 hr tablet Take 1 tablet by mouth 2 (Two) Times a Day With Meals. 180 tablet 3   • NOVOLOG MIX 70/30 FLEXPEN (70-30) 100 UNIT/ML suspension pen-injector injection Take 10 units ac breakfast, 30 units ac lunch, and 54 units ac dinner. 30 pen 3   • valsartan (DIOVAN) 40 MG tablet Take  by mouth daily.       No current facility-administered medications on file prior to visit.       Allergies   Allergen Reactions   • Shellfish Allergy Hives        Review of Systems   Constitutional: Negative.    HENT: Negative.    Eyes: Negative.    Respiratory: Negative.    Cardiovascular: Negative.    Gastrointestinal: Negative.    Endocrine: Negative.    Genitourinary: Negative.    Musculoskeletal: Positive for arthralgias.   Skin: Negative.    Allergic/Immunologic: Negative.    Neurological: Negative.    Hematological: Negative.   "  Psychiatric/Behavioral: Negative.         The following portions of the patient's history were reviewed and updated as appropriate: allergies, current medications, past family history, past medical history, past social history, past surgical history and problem list.    Physical Exam  Blood pressure (!) 187/93, pulse 93, height 160 cm (62.99\"), weight 131 kg (288 lb 5.8 oz).    Body mass index is 51.09 kg/m².    GENERAL APPEARANCE: awake, alert & oriented x 3, in no acute distress and well developed, well nourished  PSYCH: normal affect  LUNGS:  breathing nonlabored  EYES: sclera anicteric  CARDIOVASCULAR: palpable dorsalis pedis, palpable posterior tibial bilaterally. Capillary refill less than 2 seconds  EXTREMITIES: no clubbing, cyanosis  GAIT:  Antalgic            Right Lower Extremity Exam:   ----------  Hip Exam  ----------  FLEXION CONTRACTURE: None  FLEXION: 110 degrees  INTERNAL ROTATION: 20 degrees at 90 degrees of flexion   EXTERNAL ROTATION: 40 degrees at 90 degrees of flexion    PAIN WITH HIP MOTION: no  ----------  Knee Exam  ----------  ALIGNMENT: neutral, no varus or valgus deformity     RANGE OF MOTION:  Normal (0-130 degrees) with no extensor lag or flexion contracture  LIGAMENTOUS STABILITY:   stable to varus and valgus stress at 0 and 30 degrees without any evidence of laxity     STRENGTH:  5/5 knee flexion, extension. 5/5 ankle dorsiflexion and plantarflexion.     PAIN WITH PALPATION: denies tenderness to palpation about the knee, denies medial or lateral joint line pain  KNEE EFFUSION: no  PAIN WITH KNEE ROM: no  PATELLAR CREPITUS: no  SPECIAL EXAM FINDINGS:  Negative patellar compression    REFLEXES:  PATELLAR 2+/4  ACHILLES 2+/4    CLONUS: negative  STRAIGHT LEG TEST:   negative    SENSATION TO LIGHT TOUCH:  DEEP PERONEAL/SUPERFICIAL PERONEAL/SURAL/SAPHENOUS/TIBIAL:   intact    EDEMA:  no  ERYTHEMA:  no  WOUNDS/INCISIONS: none, no overlying skin problems.      Left Lower Extremity Exam: "   ----------  Hip Exam  ----------  FLEXION CONTRACTURE: None  FLEXION: 110 degrees  INTERNAL ROTATION: 20 degrees at 90 degrees of flexion   EXTERNAL ROTATION: 40 degrees at 90 degrees of flexion    PAIN WITH HIP MOTION: no  ----------  Knee Exam  ----------  ALIGNMENT: neutral    RANGE OF MOTION:  Normal (0-120 degrees) with no extensor lag or flexion contracture  LIGAMENTOUS STABILITY:   stable to varus and valgus stress at terminal extension and 30 degrees without any evidence of laxity     STRENGTH:  4/5 knee flexion, extension. 5/5 ankle dorsiflexion and plantarflexion.     PAIN WITH PALPATION: medial joint line, lateral joint line, pes bursa, anterior knee and patellar tendon  KNEE EFFUSION: yes, trace effusion  PAIN WITH KNEE ROM: yes, globally   PATELLAR CREPITUS: no  SPECIAL EXAM FINDINGS:  Unable to assess anterior posterior drawer secondary to patient guarding    REFLEXES:  PATELLAR 2+/4  ACHILLES 2+/4    CLONUS: no  STRAIGHT LEG TEST:   negative    SENSATION TO LIGHT TOUCH:  DEEP PERONEAL/SUPERFICIAL PERONEAL/SURAL/SAPHENOUS/TIBIAL:   intact    EDEMA:  no  ERYTHEMA:  no  WOUNDS/INCISIONS: no      ______________________________________________________________________  ______________________________________________________________________    RADIOGRAPHIC FINDINGS:   Radiographs from 4/20/18 were personally reviewed.  Radiographs demonstrate mild to moderate arthritic changes tricompartmentally with no acute osseous lesions, fracture, or significant bony abnormality      Assessment/Plan:   Diagnosis Plan   1. Acute pain of left knee  MRI Knee Left Without Contrast     I discussed with patient that based on her exam findings today, I recommended obtaining an MRI to evaluate for intra-articular pathology.  She is globally tender and has some significant guarding on examination today.  We will get an MRI to evaluate for intra-articular pathology and she'll follow-up in 1 week to discuss results.    Corey VALENCIA  MD Cas[facility]  05/01/18  9:40 AM

## 2018-05-02 ENCOUNTER — HOSPITAL ENCOUNTER (OUTPATIENT)
Dept: MRI IMAGING | Facility: HOSPITAL | Age: 63
Discharge: HOME | End: 2018-05-02
Attending: ORTHOPAEDIC SURGERY | Admitting: ORTHOPAEDIC SURGERY

## 2018-05-02 DIAGNOSIS — M25.562 ACUTE PAIN OF LEFT KNEE: ICD-10-CM

## 2018-05-02 PROCEDURE — 73721 MRI JNT OF LWR EXTRE W/O DYE: CPT

## 2018-05-04 ENCOUNTER — TELEPHONE (OUTPATIENT)
Dept: ORTHOPEDIC SURGERY | Facility: CLINIC | Age: 63
End: 2018-05-04

## 2018-05-04 NOTE — TELEPHONE ENCOUNTER
PATIENT HAS AN MRI FOLLOW UP SCHEDULED FOR 05/08. SHE IS SUPPOSED TO RETURN TO WORK ON 05/07. SHE WOULD LIKE TO KNOW IF SHE COULD EXTEND HER OFF WORK STATUS UNTIL SHE SEES MRL DUE TO PAIN IN HER KNEE. IF THIS IS ALRIGHT LET ME KNOW AND I WILL SEND HER A NEW NOTE. THANK YOU

## 2018-05-08 ENCOUNTER — OFFICE VISIT (OUTPATIENT)
Dept: ORTHOPEDIC SURGERY | Facility: CLINIC | Age: 63
End: 2018-05-08

## 2018-05-08 VITALS
HEART RATE: 87 BPM | WEIGHT: 288.8 LBS | BODY MASS INDEX: 51.17 KG/M2 | HEIGHT: 63 IN | DIASTOLIC BLOOD PRESSURE: 90 MMHG | SYSTOLIC BLOOD PRESSURE: 166 MMHG

## 2018-05-08 DIAGNOSIS — E66.01 MORBID OBESITY WITH BMI OF 50.0-59.9, ADULT (HCC): ICD-10-CM

## 2018-05-08 DIAGNOSIS — M17.12 PRIMARY OSTEOARTHRITIS OF LEFT KNEE: Primary | ICD-10-CM

## 2018-05-08 DIAGNOSIS — S83.412D SPRAIN OF MEDIAL COLLATERAL LIGAMENT OF LEFT KNEE, SUBSEQUENT ENCOUNTER: ICD-10-CM

## 2018-05-08 PROCEDURE — 99213 OFFICE O/P EST LOW 20 MIN: CPT | Performed by: ORTHOPAEDIC SURGERY

## 2018-05-08 PROCEDURE — 20610 DRAIN/INJ JOINT/BURSA W/O US: CPT | Performed by: ORTHOPAEDIC SURGERY

## 2018-05-08 RX ORDER — BUPIVACAINE HYDROCHLORIDE 2.5 MG/ML
3 INJECTION, SOLUTION INFILTRATION; PERINEURAL
Status: COMPLETED | OUTPATIENT
Start: 2018-05-08 | End: 2018-05-08

## 2018-05-08 RX ORDER — LIDOCAINE HYDROCHLORIDE 10 MG/ML
3 INJECTION, SOLUTION INFILTRATION; PERINEURAL
Status: COMPLETED | OUTPATIENT
Start: 2018-05-08 | End: 2018-05-08

## 2018-05-08 RX ORDER — TRIAMCINOLONE ACETONIDE 40 MG/ML
80 INJECTION, SUSPENSION INTRA-ARTICULAR; INTRAMUSCULAR
Status: COMPLETED | OUTPATIENT
Start: 2018-05-08 | End: 2018-05-08

## 2018-05-08 RX ADMIN — LIDOCAINE HYDROCHLORIDE 3 ML: 10 INJECTION, SOLUTION INFILTRATION; PERINEURAL at 09:33

## 2018-05-08 RX ADMIN — TRIAMCINOLONE ACETONIDE 80 MG: 40 INJECTION, SUSPENSION INTRA-ARTICULAR; INTRAMUSCULAR at 09:33

## 2018-05-08 RX ADMIN — BUPIVACAINE HYDROCHLORIDE 3 ML: 2.5 INJECTION, SOLUTION INFILTRATION; PERINEURAL at 09:33

## 2018-05-08 NOTE — PROGRESS NOTES
Orthopaedic Clinic Note: Knee Established Patient    Chief Complaint   Patient presents with   • Follow-up     1 week - MRI follow up - Acute pain Left knee        HPI    It has been 1  week(s) since Ms. Forde's last visit. She returns to clinic today for Follow-up of left knee pain.  She received an MRI and is here for return evaluation.  She sustained an injury initially while stepping backwards off a ladder and missed a step.  She is had intermittent mid knee pain since then.  She rates her pain a 9/10 on the pain scale.  She continues localize majority of her pain in the patellofemoral compartment as well as medial aspect of the knee.  Overall she is doing the same or slightly worse.    Past Medical History:   Diagnosis Date   • Cholelithiasis    • Diabetes mellitus    • GERD (gastroesophageal reflux disease)    • Granuloma annulare     hands, s/p multiple lesion removal   • Hiatal hernia    • Hyperlipidemia    • Hypertension       Past Surgical History:   Procedure Laterality Date   • BREAST CYST EXCISION     • CHOLECYSTECTOMY     • COLONOSCOPY  2007   • HAND SURGERY     • HYSTERECTOMY      partial   • KIDNEY STONE SURGERY  2016      Family History   Problem Relation Age of Onset   • Diabetes Mother    • COPD Mother    • Alzheimer's disease Mother    • Diabetes Father    • Heart attack Father    • Coronary artery disease Father    • Obesity Father    • Diabetes Sister    • Obesity Sister    • Diabetes Paternal Grandmother    • Lung cancer Sister      Social History     Social History   • Marital status:      Spouse name: N/A   • Number of children: N/A   • Years of education: N/A     Occupational History   •  [employer]     Social History Main Topics   • Smoking status: Never Smoker   • Smokeless tobacco: Never Used   • Alcohol use No   • Drug use: No   • Sexual activity: Defer      Comment: lives with      Other Topics Concern   • Not on file     Social History Narrative  "  • No narrative on file      Current Outpatient Prescriptions on File Prior to Visit   Medication Sig Dispense Refill   • ACCU-CHEK CINDY PLUS test strip TEST THREE TIMES DAILY 100 each 11   • atorvastatin (LIPITOR) 10 MG tablet Take 1 tablet by mouth Daily. 90 tablet 3   • Blood Glucose Monitoring Suppl (ACCU-CHEK CINDY PLUS) W/DEVICE kit TEST THREE TIMES DAILY 1 kit 0   • clobetasol (TEMOVATE) 0.05 % cream Apply 1 application topically 2 (two) times a day.     • Insulin Pen Needle (BD PEN NEEDLE BYRON U/F) 32G X 4 MM misc 1 each by Other route 3 (Three) Times a Day. 300 each 3   • Insulin Pen Needle (BD PEN NEEDLE BYRON U/F) 32G X 4 MM misc Inject 1 each under the skin 3 (Three) Times a Day Before Meals. 300 each 3   • Lancets (ACCU-CHEK MULTICLIX) lancets TEST THREE TIMES DAILY 100 each 5   • metFORMIN ER (GLUCOPHAGE-XR) 500 MG 24 hr tablet Take 1 tablet by mouth 2 (Two) Times a Day With Meals. 180 tablet 3   • NOVOLOG MIX 70/30 FLEXPEN (70-30) 100 UNIT/ML suspension pen-injector injection Take 10 units ac breakfast, 30 units ac lunch, and 54 units ac dinner. 30 pen 3   • valsartan (DIOVAN) 40 MG tablet Take  by mouth daily.       No current facility-administered medications on file prior to visit.       Allergies   Allergen Reactions   • Shellfish Allergy Hives        Review of Systems   Constitutional: Negative.    HENT: Negative.    Eyes: Negative.    Respiratory: Negative.    Cardiovascular: Negative.    Gastrointestinal: Negative.    Endocrine: Negative.    Genitourinary: Negative.    Musculoskeletal: Positive for arthralgias.   Skin: Negative.    Allergic/Immunologic: Negative.    Neurological: Negative.    Hematological: Negative.    Psychiatric/Behavioral: Negative.         Physical Exam  Blood pressure 166/90, pulse 87, height 160 cm (62.99\"), weight 131 kg (288 lb 12.8 oz).    Body mass index is 51.17 kg/m².    GENERAL APPEARANCE: awake, alert, oriented, in no acute distress and obese  LUNGS:  breathing " nonlabored  EXTREMITIES: no clubbing, cyanosis  PERIPHERAL PULSES: palpable dorsalis pedis and posterior tibial pulses bilaterally.    GAIT:  Antalgic        ----------  Left Knee Exam:  ----------  ALIGNMENT: neutral  ----------  RANGE OF MOTION:  Decreased (0 - 110 degrees)   LIGAMENTOUS STABILITY:   stable to varus and valgus stress at terminal extension and 30 degrees without any evidence of laxity  ----------  STRENGTH:  KNEE FLEXION 4/5  KNEE EXTENSION  4/5  ANKLE DORSIFLEXION  5/5  ANKLE PLANTARFLEXION  5/5  ----------  PAIN WITH PALPATION:global  KNEE EFFUSION: yes, mild effusion  PAIN WITH KNEE ROM: yes  PATELLAR CREPITUS:  yes, painful and symptomatic  ----------  SENSATION TO LIGHT TOUCH:  DEEP PERONEAL/SUPERFICIAL PERONEAL/SURAL/SAPHENOUS/TIBIAL:    intact  ----------  EDEMA:  no  ERYTHEMA:    no  WOUNDS/INCISIONS:  no  _____________________________________________________________________  _____________________________________________________________________    RADIOGRAPHIC FINDINGS:   MRI from 5/2/18 was personally reviewed.  MRI shows evidence of mild MCL sprain with degenerative changes tricompartmentally.  There is evidence of some degenerative changes of both menisci.  Mild effusion.    Assessment/Plan:   Diagnosis Plan   1. Primary osteoarthritis of left knee  Large Joint Arthrocentesis   2. Sprain of medial collateral ligament of left knee, subsequent encounter     3. Morbid obesity with BMI of 50.0-59.9, adult       I discussed with patient that I do not see any clear definitive evidence of intra-articular pathology which is contributing to her ongoing pain.  She does have an MCL strain which is minor which is explained her medial joint line pain.  Otherwise she has a knee effusion indicating intra-articular inflammation.  I recommended a cortisone injection to decrease his inflammation.  She is agreeable to this plan.  She will follow up in 6 weeks for repeat assessment.    Procedure Note:  I  discussed with the patient the potential benefits of performing a therapeutic injection of the left knee as well as potential risks including but not limited to infection, swelling, pain, bleeding, bruising, nerve/vessel damage, skin color changes, transient elevation in blood glucose levels, and fat atrophy. After informed consent and after the area was prepped with alcohol, ethyl chloride was used to numb the skin. Via the superolateral approach, 3cc of 1% lidocaine, 3cc of 0.25% marcaine and 2 cc of 40mg/ml of Kenalog were injected into the left knee. The patient tolerated the procedure well. There were no complications. A sterile dressing was placed over the injection site.        Corey Cormier MD  05/08/18  9:38 AM

## 2018-05-08 NOTE — PROGRESS NOTES
Procedure   Large Joint Arthrocentesis  Date/Time: 5/8/2018 9:33 AM  Consent given by: patient  Site marked: site marked  Timeout: Immediately prior to procedure a time out was called to verify the correct patient, procedure, equipment, support staff and site/side marked as required   Supporting Documentation  Indications: pain   Procedure Details  Location: knee - L knee  Preparation: Patient was prepped and draped in the usual sterile fashion  Needle size: 22 G  Approach: anterolateral  Medications administered: 3 mL bupivacaine 0.25 %; 3 mL lidocaine 1 %; 80 mg triamcinolone acetonide 40 MG/ML  Patient tolerance: patient tolerated the procedure well with no immediate complications

## 2018-06-19 ENCOUNTER — OFFICE VISIT (OUTPATIENT)
Dept: ORTHOPEDIC SURGERY | Facility: CLINIC | Age: 63
End: 2018-06-19

## 2018-06-19 VITALS — HEART RATE: 99 BPM | HEIGHT: 63 IN | BODY MASS INDEX: 51.91 KG/M2 | OXYGEN SATURATION: 99 % | WEIGHT: 293 LBS

## 2018-06-19 DIAGNOSIS — S83.412D SPRAIN OF MEDIAL COLLATERAL LIGAMENT OF LEFT KNEE, SUBSEQUENT ENCOUNTER: Primary | ICD-10-CM

## 2018-06-19 DIAGNOSIS — M17.12 PRIMARY OSTEOARTHRITIS OF LEFT KNEE: ICD-10-CM

## 2018-06-19 PROCEDURE — 99212 OFFICE O/P EST SF 10 MIN: CPT | Performed by: ORTHOPAEDIC SURGERY

## 2018-06-19 NOTE — PROGRESS NOTES
Orthopaedic Clinic Note: Knee Established Patient    Chief Complaint   Patient presents with   • Follow-up     6 week f/u Primary osteoarthritis of left knee        HPI    It has been 6  week(s) since Ms. Forde's last visit. She returns to clinic today for Follow-up of left knee pain.  She still having some occasional knee pain but is gradually improving.  She rates her overall pain a 3/10 on the pain scale.  Pain now is currently localized primarily along the patellofemoral region.  She is a lady with no assistive device.  Overall she is doing better.  Past Medical History:   Diagnosis Date   • Cholelithiasis    • Diabetes mellitus    • GERD (gastroesophageal reflux disease)    • Granuloma annulare     hands, s/p multiple lesion removal   • Hiatal hernia    • Hyperlipidemia    • Hypertension       Past Surgical History:   Procedure Laterality Date   • BREAST CYST EXCISION     • CHOLECYSTECTOMY     • COLONOSCOPY  2007   • HAND SURGERY     • HYSTERECTOMY      partial   • KIDNEY STONE SURGERY  2016      Family History   Problem Relation Age of Onset   • Diabetes Mother    • COPD Mother    • Alzheimer's disease Mother    • Diabetes Father    • Heart attack Father    • Coronary artery disease Father    • Obesity Father    • Diabetes Sister    • Obesity Sister    • Diabetes Paternal Grandmother    • Lung cancer Sister      Social History     Social History   • Marital status:      Spouse name: N/A   • Number of children: N/A   • Years of education: N/A     Occupational History   • [employer]     Social History Main Topics   • Smoking status: Never Smoker   • Smokeless tobacco: Never Used   • Alcohol use No   • Drug use: No   • Sexual activity: Defer      Comment: lives with      Other Topics Concern   • Not on file     Social History Narrative   • No narrative on file      Current Outpatient Prescriptions on File Prior to Visit   Medication Sig Dispense Refill   • CLAUDIA-GIGI WHITE  "PLUS test strip TEST THREE TIMES DAILY 100 each 11   • atorvastatin (LIPITOR) 10 MG tablet Take 1 tablet by mouth Daily. 90 tablet 3   • Blood Glucose Monitoring Suppl (ACCU-CHEK CINDY PLUS) W/DEVICE kit TEST THREE TIMES DAILY 1 kit 0   • clobetasol (TEMOVATE) 0.05 % cream Apply 1 application topically 2 (two) times a day.     • Insulin Pen Needle (BD PEN NEEDLE BYRON U/F) 32G X 4 MM misc 1 each by Other route 3 (Three) Times a Day. 300 each 3   • Insulin Pen Needle (BD PEN NEEDLE BYRON U/F) 32G X 4 MM misc Inject 1 each under the skin 3 (Three) Times a Day Before Meals. 300 each 3   • Lancets (ACCU-CHEK MULTICLIX) lancets TEST THREE TIMES DAILY 100 each 5   • metFORMIN ER (GLUCOPHAGE-XR) 500 MG 24 hr tablet Take 1 tablet by mouth 2 (Two) Times a Day With Meals. 180 tablet 3   • NOVOLOG MIX 70/30 FLEXPEN (70-30) 100 UNIT/ML suspension pen-injector injection Take 10 units ac breakfast, 30 units ac lunch, and 54 units ac dinner. 30 pen 3   • valsartan (DIOVAN) 40 MG tablet Take  by mouth daily.       No current facility-administered medications on file prior to visit.       Allergies   Allergen Reactions   • Shellfish Allergy Hives        Review of Systems   Constitutional: Negative.    HENT: Negative.    Eyes: Negative.    Respiratory: Negative.    Cardiovascular: Negative.    Gastrointestinal: Negative.    Endocrine: Negative.    Genitourinary: Negative.    Musculoskeletal: Positive for arthralgias (Left knee).   Skin: Negative.    Allergic/Immunologic: Negative.    Neurological: Negative.    Hematological: Negative.    Psychiatric/Behavioral: Negative.         Physical Exam  Pulse 99, height 160 cm (62.99\"), weight 134 kg (294 lb 5 oz), SpO2 99 %.    Body mass index is 52.15 kg/m².    GENERAL APPEARANCE: awake, alert, oriented, in no acute distress and obese  LUNGS:  breathing nonlabored  EXTREMITIES: no clubbing, cyanosis  PERIPHERAL PULSES: palpable dorsalis pedis and posterior tibial pulses bilaterally.    GAIT:  " Normal        ----------  Left Knee Exam:  ----------  ALIGNMENT: neutral  ----------  RANGE OF MOTION:  Normal (0 - 120 degrees)   LIGAMENTOUS STABILITY:   stable to varus and valgus stress at terminal extension and 30 degrees without any evidence of laxity  ----------  STRENGTH:  KNEE FLEXION 4/5  KNEE EXTENSION  4/5  ANKLE DORSIFLEXION  5/5  ANKLE PLANTARFLEXION  5/5  ----------  PAIN WITH PALPATION: Thornton slight medial joint line pain, no lateral joint line pain  KNEE EFFUSION: yes, trace effusion  PAIN WITH KNEE ROM: yes anteriorly with deep knee flexion   PATELLAR CREPITUS:  yes, painful and symptomatic  ----------  SENSATION TO LIGHT TOUCH:  DEEP PERONEAL/SUPERFICIAL PERONEAL/SURAL/SAPHENOUS/TIBIAL:    intact  ----------  EDEMA:  no  ERYTHEMA:    no  WOUNDS/INCISIONS:  no  _____________________________________________________________________  _____________________________________________________________________    RADIOGRAPHIC FINDINGS:   No new imaging today    Assessment/Plan:   Diagnosis Plan   1. Sprain of medial collateral ligament of left knee, subsequent encounter     2. Primary osteoarthritis of left knee       I discussed with patient that we could refer her to physical therapy for treatment of her patellofemoral arthralgia.  She declines at this time stating that she would like to continue observation and oral anti-inflammatories as needed.  I will see her back on an as-needed basis.  If her symptoms persist or worsen, then repeat physical therapy may be warranted.      Corey Cormier MD  06/19/18  9:41 AM

## 2018-07-06 ENCOUNTER — CONSULT (OUTPATIENT)
Dept: SLEEP MEDICINE | Facility: HOSPITAL | Age: 63
End: 2018-07-06

## 2018-07-06 VITALS
WEIGHT: 293 LBS | HEIGHT: 63 IN | BODY MASS INDEX: 51.91 KG/M2 | OXYGEN SATURATION: 98 % | RESPIRATION RATE: 16 BRPM | HEART RATE: 74 BPM | DIASTOLIC BLOOD PRESSURE: 77 MMHG | SYSTOLIC BLOOD PRESSURE: 161 MMHG

## 2018-07-06 DIAGNOSIS — R06.83 SNORING: Primary | ICD-10-CM

## 2018-07-06 DIAGNOSIS — G47.33 OBSTRUCTIVE SLEEP APNEA, ADULT: ICD-10-CM

## 2018-07-06 DIAGNOSIS — E66.01 MORBID OBESITY (HCC): ICD-10-CM

## 2018-07-06 PROCEDURE — 99204 OFFICE O/P NEW MOD 45 MIN: CPT | Performed by: INTERNAL MEDICINE

## 2018-07-08 NOTE — PROGRESS NOTES
"Fabiana Forde is a 63 y.o. female is being seen for consultation today at the request of Dr. Willingham for the evaluation of snoring and sleep apnea.    History of Present Illness  She states that she's had snoring \"all my life\".  She had a sleep study roughly 15 years ago and was diagnosed with obstructive sleep apnea.  She use CPAP for 10 years but says insurance quit covering her supplies so she quit using it.  She thinks her weight is up slightly.  She says she still has snoring.  She denies being told she has apneas.  She does admit to awakening gasping for breath.  She says she's usually not rested in the morning.  She has a morning headache 2 days per week.  She denies falling asleep if sitting quietly during the day.  She denies any problems while driving.  She is sleepy even if she increases her time in bed.    She has a history of loud snoring she snores in all positions.  She has awakened with a dry mouth and has trouble breathing through her nose both day and night but denies ever breaking her nose.  She denies reflux symptoms she denies hypnagogic hallucinations or sleep paralysis.  She denies kicking or jerking her legs at night.  She does have some chronic knee pain that bothers her.  Her weight is up 20-25 pounds in the past year.    She goes to bed between 10:30 and 11 PM.  She will fall asleep in 3-5 minutes.  She wakens 2-3 times during the night.  She thinks she gets 5-7 hours of sleep but feels tired.  She arises at 5 AM.  She has a history of hypertension for 8 years.  She says she's had diabetes for 30 years.  She denies any history coronary artery disease.  She does have history of arthritis.  Allergies   Allergen Reactions   • Shellfish Allergy Hives          Current Outpatient Prescriptions:   •  ACCU-CHEK CINDY PLUS test strip, TEST THREE TIMES DAILY, Disp: 100 each, Rfl: 11  •  atorvastatin (LIPITOR) 10 MG tablet, Take 1 tablet by mouth Daily., Disp: 90 tablet, Rfl: 3  •  " Blood Glucose Monitoring Suppl (ACCU-CHEK CINDY PLUS) W/DEVICE kit, TEST THREE TIMES DAILY, Disp: 1 kit, Rfl: 0  •  clobetasol (TEMOVATE) 0.05 % cream, Apply 1 application topically 2 (two) times a day., Disp: , Rfl:   •  Insulin Pen Needle (BD PEN NEEDLE BYRON U/F) 32G X 4 MM misc, 1 each by Other route 3 (Three) Times a Day., Disp: 300 each, Rfl: 3  •  Insulin Pen Needle (BD PEN NEEDLE BYRON U/F) 32G X 4 MM misc, Inject 1 each under the skin 3 (Three) Times a Day Before Meals., Disp: 300 each, Rfl: 3  •  Lancets (ACCU-CHEK MULTICLIX) lancets, TEST THREE TIMES DAILY, Disp: 100 each, Rfl: 5  •  metFORMIN ER (GLUCOPHAGE-XR) 500 MG 24 hr tablet, Take 1 tablet by mouth 2 (Two) Times a Day With Meals., Disp: 180 tablet, Rfl: 3  •  NOVOLOG MIX 70/30 FLEXPEN (70-30) 100 UNIT/ML suspension pen-injector injection, Take 10 units ac breakfast, 30 units ac lunch, and 54 units ac dinner., Disp: 30 pen, Rfl: 3  •  valsartan (DIOVAN) 40 MG tablet, Take  by mouth daily., Disp: , Rfl:     Smoking status: Never Smoker                                                              Smokeless tobacco: Never Used                           History   Alcohol Use No       Caffeine: She has 1-2 servings of tea or cola each day    Past Medical History:   Diagnosis Date   • Cholelithiasis    • Diabetes mellitus (CMS/HCC)    • GERD (gastroesophageal reflux disease)    • Granuloma annulare     hands, s/p multiple lesion removal   • Hiatal hernia    • Hyperlipidemia    • Hypertension        Past Surgical History:   Procedure Laterality Date   • BREAST CYST EXCISION     • CHOLECYSTECTOMY     • COLONOSCOPY  2007   • HAND SURGERY     • HYSTERECTOMY      partial   • KIDNEY STONE SURGERY  2016       Family History   Problem Relation Age of Onset   • Diabetes Mother    • COPD Mother    • Alzheimer's disease Mother    • Diabetes Father    • Heart attack Father    • Coronary artery disease Father    • Obesity Father    • Diabetes Sister    • Obesity Sister   "  • Diabetes Paternal Grandmother    • Lung cancer Sister    Family history is positive for hypertension and sleep apnea.    The following portions of the patient's history were reviewed and updated as appropriate: allergies, current medications, past family history, past medical history, past social history, past surgical history and problem list.    Review of Systems   Constitutional: Negative.    HENT: Positive for postnasal drip and sinus pressure.    Eyes: Negative.    Respiratory: Negative.    Cardiovascular: Positive for leg swelling.   Gastrointestinal: Positive for abdominal pain, constipation, nausea and vomiting.        She has a history of change in bowel movements and is been diagnosed with gastroparesis   Endocrine: Positive for polydipsia.   Genitourinary: Negative.    Musculoskeletal: Positive for arthralgias, back pain, gait problem, joint swelling and myalgias.   Skin: Negative.    Allergic/Immunologic: Positive for food allergies.   Hematological: Negative.    Psychiatric/Behavioral: Negative.     [place] scores 10/24    Objective     /77   Pulse 74   Resp 16   Ht 160 cm (62.99\")   Wt 133 kg (294 lb)   SpO2 98%   BMI 52.10 kg/m²      Physical Exam   Constitutional: She is oriented to person, place, and time. She appears well-developed and well-nourished.   She is morbidly obese.   HENT:   Head: Normocephalic and atraumatic.   She has nasal airway narrowing and Mallampati class to anatomy.   Eyes: EOM are normal. Pupils are equal, round, and reactive to light.   Neck: Normal range of motion. Neck supple.   Cardiovascular: Normal rate, regular rhythm and normal heart sounds.    Pulmonary/Chest: Effort normal and breath sounds normal.   Abdominal: Soft. Bowel sounds are normal.   Musculoskeletal: Normal range of motion. She exhibits no edema.   Neurological: She is alert and oriented to person, place, and time.   Skin: Skin is warm and dry.   Psychiatric: She has a normal mood and affect. " Her behavior is normal.         Assessment/Plan   Masha was seen today for sleeping problem.    Diagnoses and all orders for this visit:    Snoring  -     Home Sleep Study; Future    Obstructive sleep apnea, adult  -     Home Sleep Study; Future    Morbid obesity (CMS/[facility])    Patient has a history of snoring and nonrestorative sleep and was previously diagnosed with sleep apnea.  Her weight is up a little bit since then.  I think she still is likely to have obstructive sleep apnea.  We will plan to proceed to home sleep testing.  I've discussed possible therapies including CPAP, weight control, oral appliances, and surgery.  I've also discussed possible consequences of long-term untreated obstructive sleep apnea.  She is return then after her study.  She is encouraged to lose weight.  She is encouraged to avoid alcohol and sedatives close to bedtime.  She is encouraged practice lateral position sleep.  We will see her then after her study.         Triston Devries MD [facility]  Sleep Medicine  Pulmonary and Critical Care Medicine

## 2018-07-19 ENCOUNTER — HOSPITAL ENCOUNTER (OUTPATIENT)
Dept: SLEEP MEDICINE | Facility: HOSPITAL | Age: 63
Discharge: HOME | End: 2018-07-19
Attending: INTERNAL MEDICINE | Admitting: INTERNAL MEDICINE

## 2018-07-19 VITALS
WEIGHT: 293 LBS | SYSTOLIC BLOOD PRESSURE: 141 MMHG | HEIGHT: 63 IN | HEART RATE: 77 BPM | BODY MASS INDEX: 51.91 KG/M2 | DIASTOLIC BLOOD PRESSURE: 82 MMHG | OXYGEN SATURATION: 98 % | RESPIRATION RATE: 20 BRPM

## 2018-07-19 DIAGNOSIS — R06.83 SNORING: ICD-10-CM

## 2018-07-19 DIAGNOSIS — G47.33 OBSTRUCTIVE SLEEP APNEA, ADULT: ICD-10-CM

## 2018-07-19 PROCEDURE — 95806 SLEEP STUDY UNATT&RESP EFFT: CPT

## 2018-07-19 PROCEDURE — 95806 SLEEP STUDY UNATT&RESP EFFT: CPT | Performed by: INTERNAL MEDICINE

## 2018-08-06 ENCOUNTER — TELEPHONE (OUTPATIENT)
Dept: SLEEP MEDICINE | Facility: HOSPITAL | Age: 63
End: 2018-08-06

## 2018-08-06 DIAGNOSIS — G47.33 OBSTRUCTIVE SLEEP APNEA SYNDROME: Primary | ICD-10-CM

## 2018-08-06 DIAGNOSIS — Z79.4 TYPE 2 DIABETES MELLITUS WITH STAGE 3 CHRONIC KIDNEY DISEASE, WITH LONG-TERM CURRENT USE OF INSULIN (HCC): ICD-10-CM

## 2018-08-06 DIAGNOSIS — E11.22 TYPE 2 DIABETES MELLITUS WITH STAGE 3 CHRONIC KIDNEY DISEASE, WITH LONG-TERM CURRENT USE OF INSULIN (HCC): ICD-10-CM

## 2018-08-06 DIAGNOSIS — N18.30 TYPE 2 DIABETES MELLITUS WITH STAGE 3 CHRONIC KIDNEY DISEASE, WITH LONG-TERM CURRENT USE OF INSULIN (HCC): ICD-10-CM

## 2018-08-06 DIAGNOSIS — E78.2 MIXED HYPERLIPIDEMIA: ICD-10-CM

## 2018-08-06 NOTE — TELEPHONE ENCOUNTER
Patient called wanting to see if her sleep study results were ready.  Dr. Devries has read her study and has recommended Auto PAP therapy.  Patient was agreeable and has requested to use [facility].  We will get orders sent over today.

## 2018-08-06 NOTE — TELEPHONE ENCOUNTER
PATIENT CALLED TO REQUEST 90 DAY FILL PER INSURANCE. SHE WOULD ALSO LIKE TO KNOW IF SHE CAN MOVE HER UPCOMING APPT IN SEPT TO THIS MONTH AS SHE WILL BE LOSING INSURANCE COVERAGE IN SEPT.    CALL BACK [phone]

## 2018-08-09 RX ORDER — ATORVASTATIN CALCIUM 10 MG/1
10 TABLET, FILM COATED ORAL DAILY
Qty: 90 TABLET | Refills: 3 | Status: SHIPPED | OUTPATIENT
Start: 2018-08-09 | End: 2019-10-27 | Stop reason: SDUPTHER

## 2018-08-09 RX ORDER — METFORMIN HYDROCHLORIDE 500 MG/1
500 TABLET, EXTENDED RELEASE ORAL 2 TIMES DAILY WITH MEALS
Qty: 180 TABLET | Refills: 3 | Status: SHIPPED | OUTPATIENT
Start: 2018-08-09 | End: 2019-10-27 | Stop reason: SDUPTHER

## 2018-08-09 RX ORDER — INSULIN ASPART 100 [IU]/ML
INJECTION, SUSPENSION SUBCUTANEOUS
Qty: 30 PEN | Refills: 3 | Status: SHIPPED | OUTPATIENT
Start: 2018-08-09 | End: 2019-01-17 | Stop reason: ALTCHOICE

## 2018-09-12 ENCOUNTER — LAB REQUISITION (OUTPATIENT)
Dept: LAB | Facility: HOSPITAL | Age: 63
End: 2018-09-12

## 2018-09-12 ENCOUNTER — OFFICE VISIT (OUTPATIENT)
Dept: ENDOCRINOLOGY | Facility: CLINIC | Age: 63
End: 2018-09-12

## 2018-09-12 VITALS
HEART RATE: 73 BPM | OXYGEN SATURATION: 99 % | BODY MASS INDEX: 50.85 KG/M2 | DIASTOLIC BLOOD PRESSURE: 90 MMHG | SYSTOLIC BLOOD PRESSURE: 122 MMHG | WEIGHT: 287 LBS | HEIGHT: 63 IN

## 2018-09-12 DIAGNOSIS — I10 ESSENTIAL HYPERTENSION: ICD-10-CM

## 2018-09-12 DIAGNOSIS — Z00.00 ROUTINE GENERAL MEDICAL EXAMINATION AT A HEALTH CARE FACILITY: ICD-10-CM

## 2018-09-12 DIAGNOSIS — IMO0002 UNCONTROLLED TYPE 2 DIABETES MELLITUS WITH STAGE 3 CHRONIC KIDNEY DISEASE, WITH LONG-TERM CURRENT USE OF INSULIN: Primary | ICD-10-CM

## 2018-09-12 DIAGNOSIS — E55.9 VITAMIN D DEFICIENCY: ICD-10-CM

## 2018-09-12 DIAGNOSIS — R94.6 ABNORMAL THYROID FUNCTION TEST: ICD-10-CM

## 2018-09-12 LAB
25(OH)D3 SERPL-MCNC: 15.8 NG/ML
GLUCOSE BLDC GLUCOMTR-MCNC: 101 MG/DL (ref 70–130)
HBA1C MFR BLD: 7.3 %
TSH SERPL DL<=0.05 MIU/L-ACNC: 3.64 MIU/ML (ref 0.35–5.35)

## 2018-09-12 PROCEDURE — 82570 ASSAY OF URINE CREATININE: CPT | Performed by: INTERNAL MEDICINE

## 2018-09-12 PROCEDURE — 82043 UR ALBUMIN QUANTITATIVE: CPT | Performed by: INTERNAL MEDICINE

## 2018-09-12 PROCEDURE — 82947 ASSAY GLUCOSE BLOOD QUANT: CPT | Performed by: INTERNAL MEDICINE

## 2018-09-12 PROCEDURE — 82306 VITAMIN D 25 HYDROXY: CPT

## 2018-09-12 PROCEDURE — 99214 OFFICE O/P EST MOD 30 MIN: CPT | Performed by: INTERNAL MEDICINE

## 2018-09-12 PROCEDURE — 83036 HEMOGLOBIN GLYCOSYLATED A1C: CPT | Performed by: INTERNAL MEDICINE

## 2018-09-12 PROCEDURE — 84443 ASSAY THYROID STIM HORMONE: CPT

## 2018-09-12 RX ORDER — LANCETS
EACH MISCELLANEOUS
Qty: 300 EACH | Refills: 3 | Status: SHIPPED | OUTPATIENT
Start: 2018-09-12 | End: 2019-11-20 | Stop reason: SDUPTHER

## 2018-09-12 RX ORDER — LOSARTAN POTASSIUM 25 MG/1
25 TABLET ORAL DAILY
Qty: 90 TABLET | Refills: 3 | Status: SHIPPED | OUTPATIENT
Start: 2018-09-12 | End: 2019-10-10 | Stop reason: SDUPTHER

## 2018-09-12 NOTE — PROGRESS NOTES
Chief Complaint   Patient presents with   • Diabetes     DM2 f/u        HPI:   Masha Forde is a 63 y.o.female who returns to [facility] for f/u evaluation of her Type 2 DM. Last clinic visit 04/16/2018. Her history is as follows:    Interim Events:   - has torn Left Meniscus after minor fall. Had steroid injection in knee in May 2018 which caused transient hyperglycemia    1) Type 2 diabetes with associated complications of CKD, gastroparesis:  - diagnosed with DM in her mid 40's     Current DM Medications:  - Novolog 70/30: 10 units ac BK, 30 units ac lunch, 54 units ac dinner  - Metformin  mg BID (higher dose caused diarrhea)    Glucometer Review: forgot meter for review today  - checking BG 3 times a day  Reports Majority of pre-meal BGs have been <150   Rare hypoglycemia    DM Health Maintenance:  Ophtho: Last visit - 08/2018 - normal, [facility]  Podiatry: Last visit - none  Monofilament / foot exam: 11/2017  Lipids: (01/2018): Tchol 146, , HDL 51.9, LDL 81.4 -  on lipitor 10 mg daily  Urine Microalb/Cr ratio: has CKD stage 3, on an ARB for HTN  Aspirin: N/A  Reviewed scanned labs from PCP office from 01/2018.   CMP: 01/2018) Cr 1.01, GFR 56, LFT's WNL  Hgb 13.6    2) gastroparesis due to DM:   - has not followed up with Dr. Maryuri Mauricio, [facility] as her symptoms are stable with dietary changes  - not on medication at this time     3) Abnormal thyroid function test:  - pt had TSH of 5.65 (0.38 - 4.31), free T4 0.98 on 01/03/2018  - pt not on high dose biotin    4) vitamin D deficiency:  - h/o vitamin D deficiency  - not on a supplement currently    Review of Systems   Constitutional:        Weight stable   HENT: Negative.    Eyes: Negative.  Negative for visual disturbance.   Respiratory: Positive for shortness of breath (on exertion).    Cardiovascular: Positive for leg swelling. Negative for chest pain.        Bilateral venous stasis to lower extremities   Gastrointestinal: Positive  "for nausea (intermittent). Negative for constipation, diarrhea and vomiting.   Endocrine: Negative.  Negative for cold intolerance, polydipsia and polyuria.   Genitourinary: Negative.    Musculoskeletal:        Small, nontender nodules present in distal pats of her fingers due to her GA   Skin: Negative.  Negative for rash and wound.   Neurological: Negative.  Negative for numbness and headaches.   Hematological: Negative.    Psychiatric/Behavioral:        H/o Anxiety  depression     The following portions of the patient's history were reviewed and updated as appropriate: allergies, current medications, past family history, past medical history, past social history, past surgical history and problem list.    /90   Pulse 73   Ht 160 cm (62.99\")   Wt 130 kg (287 lb)   SpO2 99%   BMI 50.86 kg/m²   Physical Exam   Constitutional: She is oriented to person, place, and time. She appears well-developed. No distress.   obese   HENT:   Head: Normocephalic.   Eyes: Pupils are equal, round, and reactive to light. Conjunctivae and EOM are normal.   Neck: No tracheal deviation present. No thyromegaly present.   No palpable thyroid nodules     Cardiovascular: Normal rate, regular rhythm and normal heart sounds.    No murmur heard.  Pulmonary/Chest: Effort normal and breath sounds normal. No respiratory distress.   Abdominal: Soft. Bowel sounds are normal.   No scarring at insulin injection sites     Musculoskeletal: She exhibits edema (+1 lower extremity edema).   Lymphadenopathy:     She has no cervical adenopathy.   Neurological: She is alert and oriented to person, place, and time. No cranial nerve deficit.   Skin: Skin is dry. She is not diaphoretic. No erythema.   Psychiatric: She has a normal mood and affect. Her behavior is normal.   Vitals reviewed.    LABS/IMAGING:   Results for orders placed or performed in visit on 09/12/18   POC Glucose Fingerstick   Result Value Ref Range    Glucose 101 70 - 130 mg/dL "   POC Glycosylated Hemoglobin (Hb A1C)   Result Value Ref Range    Hemoglobin A1C 7.3 %        Lab Requisition on 09/12/2018   Component Date Value Ref Range Status   • 25 Hydroxy, Vitamin D 09/12/2018 15.8  ng/ml Final   • TSH 09/12/2018 3.644  0.350 - 5.350 mIU/mL Final   Office Visit on 09/12/2018   Component Date Value Ref Range Status   • Glucose 09/12/2018 101  70 - 130 mg/dL Final   • Hemoglobin A1C 09/12/2018 7.3  % Final   • Creatinine, Urine 09/12/2018 149.4  Not Estab. mg/dL Final   • Microalbumin, Urine 09/12/2018 62.2  Not Estab. ug/mL Final   • Microalbumin/Creatinine Ratio 09/12/2018 41.6* 0.0 - 30.0 mg/g creat Final       ASSESSMENT/PLAN:  1) Type 2 Diabetes with associated complication of CKD stage 3, gastroparesis: controlled, overall much improved. Hgb A1C% 7.3  today down from 7.6 in past visits   - continue metformin  mg BID in setting of mod CKD. (d/w pt okay to use per new FDA recommendation)  - continue Novolog 70/30: 10 units with breakfast, 30 units with lunch, 54 units with dinner. Pt to take 4 extra units if eating a restaurant meal.      2) essential HTN: controlled  - on valsartan 40 mg daily which has been recalled  - switched to losartan 25 mg daily    3) Abnormal thyroid function test: resolved  - pt had TSH of 5.65 (0.38 - 4.31), free T4 0.98 on 01/03/2018  - pt not on high dose biotin  - Repeat test today shows a normal TSH of 3.644    4) vitamin D deficiency:  - 25 (OH) level is 15.8 today.  - Recommended OTC vitamin D3 2000 IU daily to patient  - Will check her level at follow-up visit    RTC 4 months

## 2018-09-14 LAB
CREAT 24H UR-MCNC: 149.4 MG/DL
MICROALBUMIN UR-MCNC: 62.2 UG/ML
MICROALBUMIN/CREAT UR: 41.6 MG/G CREAT (ref 0–30)

## 2018-09-16 ENCOUNTER — TELEPHONE (OUTPATIENT)
Dept: ENDOCRINOLOGY | Facility: CLINIC | Age: 63
End: 2018-09-16

## 2018-09-16 NOTE — TELEPHONE ENCOUNTER
Spoke to patient about lab results. See clinic note from 09/12/2018 for details.   Ruthie Roberts MD

## 2019-01-10 ENCOUNTER — OFFICE VISIT (OUTPATIENT)
Dept: SLEEP MEDICINE | Facility: HOSPITAL | Age: 64
End: 2019-01-10

## 2019-01-10 VITALS
OXYGEN SATURATION: 96 % | HEART RATE: 74 BPM | WEIGHT: 293 LBS | BODY MASS INDEX: 51.91 KG/M2 | SYSTOLIC BLOOD PRESSURE: 169 MMHG | HEIGHT: 63 IN | DIASTOLIC BLOOD PRESSURE: 80 MMHG

## 2019-01-10 DIAGNOSIS — G47.33 OSA (OBSTRUCTIVE SLEEP APNEA): Primary | ICD-10-CM

## 2019-01-10 DIAGNOSIS — G47.34 NOCTURNAL HYPOXEMIA: ICD-10-CM

## 2019-01-10 PROCEDURE — 99213 OFFICE O/P EST LOW 20 MIN: CPT | Performed by: NURSE PRACTITIONER

## 2019-01-10 NOTE — PROGRESS NOTES
Subjective:   Follow-up      Chief Complaint:   Chief Complaint   Patient presents with   • Follow-up       HPI:    Masha Forde is a 63 y.o. female here for follow-up of jb., noc hypoxemia .  Patient was last seen 7/6/18 in consult by Dr. Triston Devries.  The patient had persistent snoring and a 15 year diagnosis of obstructive sleep apnea.  She had not been using her CPAP in some time due to not being able to get supplies.  She had a home sleep study on 7/19/18 that did show severe obstructive sleep apnea as well as nocturnal hypoxemia.  Patient states she is doing very well with her CPAP therapy and is compliant.  She is sleeping 6-8 hours nightly and feels rested upon awakening.  Her [place] score is 8/24.  Patient is happy with current settings and wishes to continue CPAP therapy.        Current medications are:   Current Outpatient Medications:   •  ACCU-CHEK CINDY PLUS test strip, TEST THREE TIMES DAILY, Disp: 100 each, Rfl: 11  •  atorvastatin (LIPITOR) 10 MG tablet, Take 1 tablet by mouth Daily., Disp: 90 tablet, Rfl: 3  •  Blood Glucose Monitoring Suppl (ACCU-CHEK CINDY PLUS) W/DEVICE kit, TEST THREE TIMES DAILY, Disp: 1 kit, Rfl: 0  •  clobetasol (TEMOVATE) 0.05 % cream, Apply 1 application topically 2 (two) times a day., Disp: , Rfl:   •  insulin lispro protamine-insulin lispro (humaLOG 75-25) (75-25) 100 UNIT/ML suspension injection, Inject  under the skin into the appropriate area as directed 2 (Two) Times a Day With Meals., Disp: , Rfl:   •  Insulin Pen Needle (BD PEN NEEDLE BYRON U/F) 32G X 4 MM misc, Inject 1 each under the skin 3 (Three) Times a Day Before Meals., Disp: 300 each, Rfl: 3  •  Lancets (ACCU-CHEK MULTICLIX) lancets, TEST THREE TIMES DAILY, Disp: 300 each, Rfl: 3  •  losartan (COZAAR) 25 MG tablet, Take 1 tablet by mouth Daily., Disp: 90 tablet, Rfl: 3  •  metFORMIN ER (GLUCOPHAGE-XR) 500 MG 24 hr tablet, Take 1 tablet by mouth 2 (Two) Times a Day With Meals., Disp: 180 tablet, Rfl: 3  •  " NOVOLOG MIX 70/30 FLEXPEN (70-30) 100 UNIT/ML suspension pen-injector injection, Take 10 units ac breakfast, 30 units ac lunch, and 54 units ac dinner., Disp: 30 pen, Rfl: 3.      The patient's relevant past medical, surgical, family and social history were reviewed and updated in Epic as appropriate.       Review of Systems   HENT: Positive for congestion, postnasal drip and sinus pressure.    Respiratory: Positive for apnea.    Cardiovascular: Positive for leg swelling.   Gastrointestinal: Positive for abdominal pain, constipation, nausea and vomiting.   Endocrine: Positive for polydipsia.   Musculoskeletal: Positive for arthralgias, back pain, gait problem, joint swelling and myalgias.   Allergic/Immunologic: Positive for food allergies.   Psychiatric/Behavioral: Positive for sleep disturbance.   All other systems reviewed and are negative.        Objective:    Physical Exam   Constitutional: She is oriented to person, place, and time. She appears well-developed and well-nourished.   Morbidly obese female   HENT:   Head: Normocephalic and atraumatic.   Mouth/Throat: Oropharynx is clear and moist.   Mallampati 2 anatomy   Eyes: Conjunctivae are normal.   Neck: Neck supple. No thyromegaly present.   Cardiovascular: Normal rate and regular rhythm.   Pulmonary/Chest: Effort normal and breath sounds normal.   Lymphadenopathy:     She has no cervical adenopathy.   Neurological: She is alert and oriented to person, place, and time.   Skin: Skin is warm and dry.   Psychiatric: She has a normal mood and affect. Her behavior is normal. Judgment and thought content normal.   Nursing note and vitals reviewed.    /80   Pulse 74   Ht 160 cm (63\")   Wt 133 kg (293 lb)   SpO2 96%   BMI 51.90 kg/m²   107/107 days of use.  Greater than 4 hour use 98.1%.  90% pressure 9.7 cm H2O.  AHI of 2.2.    ASSESSMENT/PLAN    Masha was seen today for follow-up.    Diagnoses and all orders for this visit:    JEANNETTE (obstructive sleep " apnea)  -     CPAP Therapy  -     Overnight Sleep Oximetry Study; Future    Nocturnal hypoxemia  -     Overnight Sleep Oximetry Study; Future            1. Counseled patient regarding multimodal approach with healthy nutrition, healthy sleep, regular physical activity, social activities, counseling, and medications. Encouraged to practice lateral sleep position. Avoid alcohol and sedatives close to bedtime.  2.   Refill supplies ×1 year.  Return to clinic one year or sooner as symptoms warrant.  24-hour pulse oximetry ordered while wearing CPAP and I will call her with those results.  I have reviewed the results of my evaluation and impression and discussed my recommendations in detail with the patient.      Signed by  Eunice Leonard, APRN    January 10, 2019      CC: Danae Willingham MD          No ref. provider found

## 2019-01-10 NOTE — PATIENT INSTRUCTIONS
Hypoxemia  Hypoxemia occurs when the blood does not contain enough oxygen. The body cannot work well when it does not have enough oxygen because every part of the body needs oxygen. Oxygen enters the lungs when we breathe in, then it travels to all parts of the body through the blood. Hypoxemia can develop suddenly or slowly.  What are the causes?  Common causes of this condition include:  · Long-term (chronic) lung diseases, such as chronic obstructive pulmonary disease (COPD) or interstitial lung disease.  · Disorders that affect breathing at night, such as sleep apnea.  · Fluid buildup in the lungs (pulmonary edema).  · Lung infection (pneumonia).  · Lung or throat cancer.  · Abnormal blood flow that bypasses the lungs (having a shunt).  · Certain diseases that affect nerves or muscles.  · A collapsed lung (pneumothorax).  · A blood clot in the lungs (pulmonary embolus).  · Certain types of heart disease.  · Slow or shallow breathing (hypoventilation).  · Certain medicines.  · High altitudes.  · Toxic chemicals, smoke, and gases.    What are the signs or symptoms?  In some cases, there may be no symptoms of this condition. If you do have symptoms, they may include:  · Shortness of breath (dyspnea).  · Bluish color of the skin, lips, or nail beds.  · Breathing that is fast, noisy, or shallow.  · A fast heartbeat.  · Feeling tired or sleepy.  · Feeling confused or worried.    If hypoxemia develops quickly, you will likely have dyspnea. If hypoxemia develops slowly over months or years, you may not notice any symptoms.  How is this diagnosed?  This condition is diagnosed by:  · A physical exam.  · Blood tests.  · A test that measures the percentage of oxygen in your blood (pulse oximetry). This is done with a sensor that is placed on your finger, toe, or earlobe.    How is this treated?  Treatment for this condition depends on the underlying cause of your hypoxemia. You will likely be treated with oxygen therapy to  restore your blood oxygen level. Depending on the cause of your hypoxemia, you may need oxygen therapy for a short time (weeks or months), or you may need it for the rest of your life.  Your health care provider may also recommend other therapies to treat the underlying cause of your hypoxemia.  Follow these instructions at home:  · Take over-the-counter and prescription medicines only as told by your health care provider.  · If you are on oxygen therapy, follow oxygen safety precautions as directed by your health care provider. These may include:  ? Always having a backup supply of oxygen.  ? Not allowing anyone to smoke or have a fire around oxygen.  ? Handling oxygen tanks carefully and as instructed.  · Do not use any products that contain nicotine or tobacco, such as cigarettes and e-cigarettes. If you need help quitting, ask your health care provider. Stay away from people who smoke.  · Keep all follow-up visits as told by your health care provider. This is important.  Contact a health care provider if:  · You have any concerns about your oxygen therapy.  · You have trouble breathing, even during or after treatment.  · You become short of breath when you exercise.  · You are tired when you wake up.  · You have a headache when you wake up.  Get help right away if:  · Your shortness of breath gets worse, especially with normal or minimal activity.  · You have a bluish color of the skin, lips, or nail beds.  · You become confused or you cannot think properly.  · You cough up dark mucus or blood.  · You have chest pain.  · You have a fever.  Summary  · Hypoxemia occurs when the blood does not contain enough oxygen.  · Hypoxemia may or may not cause symptoms. Often, the main symptom is shortness of breath (dyspnea).  · Depending on the cause of your hypoxemia, you may need oxygen therapy for a short time (weeks or months), or you may need it for the rest of your life.  · If you are on oxygen therapy, follow oxygen  safety precautions as directed by your health care provider.  This information is not intended to replace advice given to you by your health care provider. Make sure you discuss any questions you have with your health care provider.  Document Released: 07/02/2012 Document Revised: 11/21/2017 Document Reviewed: 11/21/2017  [employer] Interactive Patient Education © 2017 [employer]  Sleep Apnea  Sleep apnea is a condition that affects breathing. People with sleep apnea have moments during sleep when their breathing pauses briefly or gets shallow. Sleep apnea can cause these symptoms:  · Trouble staying asleep.  · Sleepiness or tiredness during the day.  · Irritability.  · Loud snoring.  · Morning headaches.  · Trouble concentrating.  · Forgetting things.  · Less interest in sex.  · Being sleepy for no reason.  · Mood swings.  · Personality changes.  · Depression.  · Waking up a lot during the night to pee (urinate).  · Dry mouth.  · Sore throat.    Follow these instructions at home:  · Make any changes in your routine that your doctor recommends.  · Eat a healthy, well-balanced diet.  · Take over-the-counter and prescription medicines only as told by your doctor.  · Avoid using alcohol, calming medicines (sedatives), and narcotic medicines.  · Take steps to lose weight if you are overweight.  · If you were given a machine (device) to use while you sleep, use it only as told by your doctor.  · Do not use any tobacco products, such as cigarettes, chewing tobacco, and e-cigarettes. If you need help quitting, ask your doctor.  · Keep all follow-up visits as told by your doctor. This is important.  Contact a doctor if:  · The machine that you were given to use during sleep is uncomfortable or does not seem to be working.  · Your symptoms do not get better.  · Your symptoms get worse.  Get help right away if:  · Your chest hurts.  · You have trouble breathing in enough air (shortness of breath).  · You have an  uncomfortable feeling in your back, arms, or stomach.  · You have trouble talking.  · One side of your body feels weak.  · A part of your face is hanging down (drooping).  These symptoms may be an emergency. Do not wait to see if the symptoms will go away. Get medical help right away. Call your local emergency services (911 in the [place] Do not drive yourself to the hospital.  This information is not intended to replace advice given to you by your health care provider. Make sure you discuss any questions you have with your health care provider.  Document Released: 09/26/2009 Document Revised: 08/13/2017 Document Reviewed: 09/26/2016  [employer] Interactive Patient Education © 2018 [employer]

## 2019-01-17 ENCOUNTER — OFFICE VISIT (OUTPATIENT)
Dept: ORTHOPEDIC SURGERY | Facility: CLINIC | Age: 64
End: 2019-01-17

## 2019-01-17 VITALS — BODY MASS INDEX: 51.91 KG/M2 | OXYGEN SATURATION: 98 % | HEIGHT: 63 IN | HEART RATE: 95 BPM | WEIGHT: 293 LBS

## 2019-01-17 DIAGNOSIS — M25.561 RIGHT KNEE PAIN, UNSPECIFIED CHRONICITY: ICD-10-CM

## 2019-01-17 DIAGNOSIS — M70.50 PES ANSERINE BURSITIS: Primary | ICD-10-CM

## 2019-01-17 DIAGNOSIS — E66.01 MORBID OBESITY WITH BMI OF 50.0-59.9, ADULT (HCC): ICD-10-CM

## 2019-01-17 DIAGNOSIS — M17.11 PRIMARY OSTEOARTHRITIS OF RIGHT KNEE: ICD-10-CM

## 2019-01-17 PROCEDURE — 99214 OFFICE O/P EST MOD 30 MIN: CPT | Performed by: ORTHOPAEDIC SURGERY

## 2019-01-17 PROCEDURE — 20610 DRAIN/INJ JOINT/BURSA W/O US: CPT | Performed by: ORTHOPAEDIC SURGERY

## 2019-01-17 RX ORDER — TRIAMCINOLONE ACETONIDE 40 MG/ML
80 INJECTION, SUSPENSION INTRA-ARTICULAR; INTRAMUSCULAR
Status: COMPLETED | OUTPATIENT
Start: 2019-01-17 | End: 2019-01-17

## 2019-01-17 RX ORDER — LIDOCAINE HYDROCHLORIDE 10 MG/ML
3 INJECTION, SOLUTION INFILTRATION; PERINEURAL
Status: COMPLETED | OUTPATIENT
Start: 2019-01-17 | End: 2019-01-17

## 2019-01-17 RX ADMIN — TRIAMCINOLONE ACETONIDE 80 MG: 40 INJECTION, SUSPENSION INTRA-ARTICULAR; INTRAMUSCULAR at 10:22

## 2019-01-17 RX ADMIN — LIDOCAINE HYDROCHLORIDE 3 ML: 10 INJECTION, SOLUTION INFILTRATION; PERINEURAL at 10:22

## 2019-01-17 NOTE — PROGRESS NOTES
Orthopaedic Clinic Note: Knee Established Patient    Chief complaint: Right knee pain       HPI    It has been 7  month(s) since Ms. Forde's last visit. She returns to clinic today for new complaint of right knee pain.  This is a new problem.  I previously seen her for left knee pain.  She rates her pain a 6/10 on the pain scale.  She localizes her pain in the medial proximal tibia.  She states her pain is worse with weightbearing and walking.  She complaining of stiffness and swelling.  She is complaining of a constant dull ache localized to the medial proximal tibia.  She denies mechanical symptoms.  Overall, despite anti-inflammatory she is continuing to have limitations daily activity.    Past Medical History:   Diagnosis Date   • Cholelithiasis    • Diabetes mellitus (CMS/HCC)    • GERD (gastroesophageal reflux disease)    • Granuloma annulare     hands, s/p multiple lesion removal   • Hiatal hernia    • Hyperlipidemia    • Hypertension       Past Surgical History:   Procedure Laterality Date   • BREAST CYST EXCISION     • CHOLECYSTECTOMY     • COLONOSCOPY  2007   • HAND SURGERY     • HYSTERECTOMY      partial   • KIDNEY STONE SURGERY  2016      Family History   Problem Relation Age of Onset   • Diabetes Mother    • COPD Mother    • Alzheimer's disease Mother    • Diabetes Father    • Heart attack Father    • Coronary artery disease Father    • Obesity Father    • Diabetes Sister    • Obesity Sister    • Diabetes Paternal Grandmother    • Lung cancer Sister      Social History     Socioeconomic History   • Marital status:      Spouse name: Not on file   • Number of children: Not on file   • Years of education: Not on file   • Highest education level: Not on file   Social Needs   • Financial resource strain: Not on file   • Food insecurity - worry: Not on file   • Food insecurity - inability: Not on file   • Transportation needs - medical: Not on file   • Transportation needs - non-medical: Not on file    Occupational History   • Occupation:      Employer: [employer]   Tobacco Use   • Smoking status: Never Smoker   • Smokeless tobacco: Never Used   Substance and Sexual Activity   • Alcohol use: No   • Drug use: No   • Sexual activity: Defer     Comment: lives with    Other Topics Concern   • Not on file   Social History Narrative   • Not on file      Current Outpatient Medications on File Prior to Visit   Medication Sig Dispense Refill   • ACCU-CHEK CINDY PLUS test strip TEST THREE TIMES DAILY 100 each 11   • atorvastatin (LIPITOR) 10 MG tablet Take 1 tablet by mouth Daily. 90 tablet 3   • Blood Glucose Monitoring Suppl (ACCU-CHEK CINDY PLUS) W/DEVICE kit TEST THREE TIMES DAILY 1 kit 0   • clobetasol (TEMOVATE) 0.05 % cream Apply 1 application topically 2 (two) times a day.     • Insulin Lispro Prot & Lispro (HUMALOG MIX 75/25 PEN SC) Inject  under the skin into the appropriate area as directed.     • insulin lispro protamine-insulin lispro (humaLOG 75-25) (75-25) 100 UNIT/ML suspension injection Inject  under the skin into the appropriate area as directed 2 (Two) Times a Day With Meals.     • Insulin Pen Needle (BD PEN NEEDLE BYRON U/F) 32G X 4 MM misc Inject 1 each under the skin 3 (Three) Times a Day Before Meals. 300 each 3   • Lancets (ACCU-CHEK MULTICLIX) lancets TEST THREE TIMES DAILY 300 each 3   • losartan (COZAAR) 25 MG tablet Take 1 tablet by mouth Daily. 90 tablet 3   • metFORMIN ER (GLUCOPHAGE-XR) 500 MG 24 hr tablet Take 1 tablet by mouth 2 (Two) Times a Day With Meals. 180 tablet 3   • [DISCONTINUED] NOVOLOG MIX 70/30 FLEXPEN (70-30) 100 UNIT/ML suspension pen-injector injection Take 10 units ac breakfast, 30 units ac lunch, and 54 units ac dinner. 30 pen 3     No current facility-administered medications on file prior to visit.       Allergies   Allergen Reactions   • Shellfish Allergy Hives        Review of Systems   Constitutional: Negative.    HENT: Negative.   "  Eyes: Negative.    Respiratory: Negative.    Cardiovascular: Negative.    Gastrointestinal: Negative.    Endocrine: Negative.    Genitourinary: Negative.    Musculoskeletal: Positive for joint swelling.        Joint Pain    Skin: Negative.    Allergic/Immunologic: Negative.    Neurological: Negative.    Hematological: Negative.    Psychiatric/Behavioral: Negative.         Physical Exam  Pulse 95, height 160 cm (62.99\"), weight 133 kg (293 lb 3.4 oz), SpO2 98 %.    Body mass index is 51.95 kg/m².    GENERAL APPEARANCE: awake, alert, oriented, in no acute distress  LUNGS:  breathing nonlabored  EXTREMITIES: no clubbing, cyanosis  PERIPHERAL PULSES: palpable dorsalis pedis and posterior tibial pulses bilaterally.    GAIT:  Antalgic        ----------  Right Knee Exam:  ----------  ALIGNMENT: neutral  ----------  RANGE OF MOTION:  Decreased (0 - 100 degrees) with no extensor lag  LIGAMENTOUS STABILITY:   stable to varus and valgus stress at terminal extension and 30 degrees without any evidence of laxity  ----------  STRENGTH:  KNEE FLEXION 5/5  KNEE EXTENSION  5/5  ANKLE DORSIFLEXION  5/5  ANKLE PLANTARFLEXION  5/5  ----------  PAIN WITH PALPATION:pes bursa  KNEE EFFUSION: no  PAIN WITH KNEE ROM: yes  PATELLAR CREPITUS:  yes, asymptomatic  ----------  SENSATION TO LIGHT TOUCH:  DEEP PERONEAL/SUPERFICIAL PERONEAL/SURAL/SAPHENOUS/TIBIAL:    intact  ----------  EDEMA:  no  ERYTHEMA:    no  WOUNDS/INCISIONS:  no  _____________________________________________________________________  _____________________________________________________________________    RADIOGRAPHIC FINDINGS:   Indication: Right knee pain     Comparison: No prior xrays are available for comparison    Knee films: Mild tricompartmental arthritic changes noted bony injury or fracture.    Assessment/Plan:   Diagnosis Plan   1. Pes anserine bursitis  Large Joint Arthrocentesis:R Pes Bursa   2. Right knee pain, unspecified chronicity  XR Knee 4+ View Right "   3. Primary osteoarthritis of right knee     4. Morbid obesity with BMI of 50.0-59.9, adult (CMS/[facility])       Patient's symptoms are consistent with pes bursitis.  I recommended an injection into the pes bursa today.  She is agreeable to this.  Furthermore, and structural importance of weight loss to decrease joint reaction forces through the knee.  She'll follow-up as needed.    Procedure Note:  I discussed with the patient the potential benefits of performing a therapeutic injection of the right knee pes bursa as well as potential risks including but not limited to infection, swelling, pain, bleeding, bruising, nerve/vessel damage, skin color changes, transient elevation in blood glucose levels, and fat atrophy. After informed consent and after the area was prepped with alcohol, ethyl chloride was used to numb the skin. Via the anteromedial approach, 2 cc of 1% lidocaine, 2 cc of 0.25% marcaine and 1 cc of 40mg/ml of Kenalog were injected into the right knee pes bursa. The patient tolerated the procedure well. There were no complications. A sterile dressing was placed over the injection site.      Corey Cormier MD  01/17/19  10:31 AM

## 2019-01-17 NOTE — PROGRESS NOTES
Procedure   Large Joint Arthrocentesis:R Pes Bursa  Date/Time: 1/17/2019 10:22 AM  Consent given by: patient  Site marked: site marked  Timeout: Immediately prior to procedure a time out was called to verify the correct patient, procedure, equipment, support staff and site/side marked as required   Supporting Documentation  Indications: pain   Procedure Details  Location: knee - R knee  Needle size: 22 G  Medications administered: 3 mL lidocaine 1 %; 80 mg triamcinolone acetonide 40 MG/ML (3cc 0.25% Bupivacaine 8153-8460-89 [id number] exp: [id number])  Patient tolerance: patient tolerated the procedure well with no immediate complications

## 2019-01-25 DIAGNOSIS — G47.33 OSA (OBSTRUCTIVE SLEEP APNEA): ICD-10-CM

## 2019-01-25 DIAGNOSIS — G47.34 NOCTURNAL HYPOXEMIA: ICD-10-CM

## 2019-01-30 ENCOUNTER — TELEPHONE (OUTPATIENT)
Dept: SLEEP MEDICINE | Facility: HOSPITAL | Age: 64
End: 2019-01-30

## 2019-04-02 DIAGNOSIS — IMO0002 UNCONTROLLED TYPE 2 DIABETES MELLITUS WITH STAGE 3 CHRONIC KIDNEY DISEASE, WITH LONG-TERM CURRENT USE OF INSULIN: ICD-10-CM

## 2019-04-04 RX ORDER — PEN NEEDLE, DIABETIC 32GX 5/32"
NEEDLE, DISPOSABLE MISCELLANEOUS
Qty: 100 EACH | Refills: 1 | Status: SHIPPED | OUTPATIENT
Start: 2019-04-04 | End: 2019-11-20

## 2019-04-11 ENCOUNTER — TRANSCRIBE ORDERS (OUTPATIENT)
Dept: ADMINISTRATIVE | Facility: HOSPITAL | Age: 64
End: 2019-04-11

## 2019-04-11 DIAGNOSIS — Z12.31 VISIT FOR SCREENING MAMMOGRAM: Primary | ICD-10-CM

## 2019-05-14 ENCOUNTER — TRANSCRIBE ORDERS (OUTPATIENT)
Dept: ADMINISTRATIVE | Facility: HOSPITAL | Age: 64
End: 2019-05-14

## 2019-05-14 ENCOUNTER — HOSPITAL ENCOUNTER (OUTPATIENT)
Dept: CT IMAGING | Facility: HOSPITAL | Age: 64
Discharge: HOME | End: 2019-05-14
Admitting: FAMILY MEDICINE

## 2019-05-14 DIAGNOSIS — R31.9 HEMATURIA, UNSPECIFIED TYPE: ICD-10-CM

## 2019-05-14 DIAGNOSIS — R31.9 HEMATURIA, UNSPECIFIED TYPE: Primary | ICD-10-CM

## 2019-05-14 PROCEDURE — 74176 CT ABD & PELVIS W/O CONTRAST: CPT

## 2019-05-28 ENCOUNTER — HOSPITAL ENCOUNTER (OUTPATIENT)
Dept: MAMMOGRAPHY | Facility: HOSPITAL | Age: 64
Discharge: HOME | End: 2019-05-28
Admitting: FAMILY MEDICINE

## 2019-05-28 DIAGNOSIS — Z12.31 VISIT FOR SCREENING MAMMOGRAM: ICD-10-CM

## 2019-05-28 PROCEDURE — 77063 BREAST TOMOSYNTHESIS BI: CPT

## 2019-05-28 PROCEDURE — 77067 SCR MAMMO BI INCL CAD: CPT

## 2019-05-28 PROCEDURE — 77063 BREAST TOMOSYNTHESIS BI: CPT | Performed by: RADIOLOGY

## 2019-05-28 PROCEDURE — 77067 SCR MAMMO BI INCL CAD: CPT | Performed by: RADIOLOGY

## 2019-08-12 ENCOUNTER — TRANSCRIBE ORDERS (OUTPATIENT)
Dept: ADMINISTRATIVE | Facility: HOSPITAL | Age: 64
End: 2019-08-12

## 2019-08-12 ENCOUNTER — HOSPITAL ENCOUNTER (OUTPATIENT)
Dept: GENERAL RADIOLOGY | Facility: HOSPITAL | Age: 64
Discharge: HOME | End: 2019-08-12
Admitting: FAMILY MEDICINE

## 2019-08-12 DIAGNOSIS — M54.5 LOW BACK PAIN, UNSPECIFIED BACK PAIN LATERALITY, UNSPECIFIED CHRONICITY, WITH SCIATICA PRESENCE UNSPECIFIED: ICD-10-CM

## 2019-08-12 DIAGNOSIS — M54.5 LOW BACK PAIN, UNSPECIFIED BACK PAIN LATERALITY, UNSPECIFIED CHRONICITY, WITH SCIATICA PRESENCE UNSPECIFIED: Primary | ICD-10-CM

## 2019-08-12 PROCEDURE — 72100 X-RAY EXAM L-S SPINE 2/3 VWS: CPT

## 2019-09-26 DIAGNOSIS — I10 ESSENTIAL HYPERTENSION: ICD-10-CM

## 2019-09-26 RX ORDER — LOSARTAN POTASSIUM 25 MG/1
TABLET ORAL
Qty: 90 TABLET | Refills: 2 | OUTPATIENT
Start: 2019-09-26

## 2019-10-10 DIAGNOSIS — I10 ESSENTIAL HYPERTENSION: ICD-10-CM

## 2019-10-10 RX ORDER — LOSARTAN POTASSIUM 25 MG/1
25 TABLET ORAL DAILY
Qty: 90 TABLET | Refills: 1 | Status: SHIPPED | OUTPATIENT
Start: 2019-10-10 | End: 2019-11-20 | Stop reason: DRUGHIGH

## 2019-10-24 ENCOUNTER — OFFICE VISIT (OUTPATIENT)
Dept: ORTHOPEDIC SURGERY | Facility: CLINIC | Age: 64
End: 2019-10-24

## 2019-10-24 VITALS — HEIGHT: 63 IN | OXYGEN SATURATION: 99 % | BODY MASS INDEX: 48.41 KG/M2 | WEIGHT: 273.2 LBS | HEART RATE: 102 BPM

## 2019-10-24 DIAGNOSIS — M70.50 PES ANSERINE BURSITIS: Primary | ICD-10-CM

## 2019-10-24 DIAGNOSIS — M17.11 PRIMARY OSTEOARTHRITIS OF RIGHT KNEE: ICD-10-CM

## 2019-10-24 PROCEDURE — 20610 DRAIN/INJ JOINT/BURSA W/O US: CPT | Performed by: ORTHOPAEDIC SURGERY

## 2019-10-24 PROCEDURE — 99213 OFFICE O/P EST LOW 20 MIN: CPT | Performed by: ORTHOPAEDIC SURGERY

## 2019-10-24 RX ORDER — TRIAMCINOLONE ACETONIDE 40 MG/ML
80 INJECTION, SUSPENSION INTRA-ARTICULAR; INTRAMUSCULAR
Status: COMPLETED | OUTPATIENT
Start: 2019-10-24 | End: 2019-10-24

## 2019-10-24 RX ORDER — LIDOCAINE HYDROCHLORIDE 10 MG/ML
2 INJECTION, SOLUTION EPIDURAL; INFILTRATION; INTRACAUDAL; PERINEURAL
Status: COMPLETED | OUTPATIENT
Start: 2019-10-24 | End: 2019-10-24

## 2019-10-24 RX ORDER — MELOXICAM 15 MG/1
TABLET ORAL
Qty: 60 TABLET | Refills: 0 | Status: SHIPPED | OUTPATIENT
Start: 2019-10-24 | End: 2022-09-15

## 2019-10-24 RX ADMIN — TRIAMCINOLONE ACETONIDE 80 MG: 40 INJECTION, SUSPENSION INTRA-ARTICULAR; INTRAMUSCULAR at 09:43

## 2019-10-24 RX ADMIN — LIDOCAINE HYDROCHLORIDE 2 ML: 10 INJECTION, SOLUTION EPIDURAL; INFILTRATION; INTRACAUDAL; PERINEURAL at 09:43

## 2019-10-24 NOTE — PROGRESS NOTES
Orthopaedic Clinic Note: Knee Established Patient    Chief Complaint   Patient presents with   • Right Knee - Follow-up     9 month - Pes anserine bursitis (Last injection 01/17/2019)         HPI    It has been 9  month(s) since Ms. Forde's last visit. She returns to clinic today for follow-up right knee pain.  She underwent a pes bursa injection 9 months ago.  The injection worked extremely well up until about 3 to 4 months ago when she is developed some recurrent intermittent pain along the medial proximal tibia.  She states that this pain began again when she was doing a lot of baseboard painting.  She is ambulating with no assistive device.  She remains overweight with a BMI of 48.  She is here to discuss treatment for ongoing right knee pain.    Past Medical History:   Diagnosis Date   • Cholelithiasis    • Diabetes mellitus (CMS/HCC)    • GERD (gastroesophageal reflux disease)    • Granuloma annulare     hands, s/p multiple lesion removal   • Hiatal hernia    • Hyperlipidemia    • Hypertension       Past Surgical History:   Procedure Laterality Date   • BREAST CYST EXCISION Right    • CHOLECYSTECTOMY     • COLONOSCOPY  2007   • HAND SURGERY     • HYSTERECTOMY      partial; AGE 35-40   • KIDNEY STONE SURGERY  2016   • OOPHORECTOMY Right     AGE 35-40      Family History   Problem Relation Age of Onset   • Diabetes Mother    • COPD Mother    • Alzheimer's disease Mother    • Diabetes Father    • Heart attack Father    • Coronary artery disease Father    • Obesity Father    • Diabetes Sister    • Obesity Sister    • Diabetes Paternal Grandmother    • Lung cancer Sister    • Breast cancer Neg Hx    • Ovarian cancer Neg Hx      Social History     Socioeconomic History   • Marital status:      Spouse name: Not on file   • Number of children: Not on file   • Years of education: Not on file   • Highest education level: Not on file   Occupational History   • Occupation:      Employer: [employer]   Tobacco Use   • Smoking status: Never Smoker   • Smokeless tobacco: Never Used   Substance and Sexual Activity   • Alcohol use: No   • Drug use: No   • Sexual activity: Defer     Comment: lives with       Current Outpatient Medications on File Prior to Visit   Medication Sig Dispense Refill   • ACCU-CHEK CINDY PLUS test strip TEST THREE TIMES DAILY 100 each 11   • atorvastatin (LIPITOR) 10 MG tablet Take 1 tablet by mouth Daily. 90 tablet 3   • BD PEN NEEDLE BYRON U/F 32G X 4 MM misc USE TWO TIMES A  each 1   • Blood Glucose Monitoring Suppl (ACCU-CHEK CINDY PLUS) W/DEVICE kit TEST THREE TIMES DAILY 1 kit 0   • clobetasol (TEMOVATE) 0.05 % cream Apply 1 application topically 2 (two) times a day.     • Insulin Lispro Prot & Lispro (HUMALOG MIX 75/25 PEN SC) Inject  under the skin into the appropriate area as directed.     • insulin lispro protamine-insulin lispro (humaLOG 75-25) (75-25) 100 UNIT/ML suspension injection Inject  under the skin into the appropriate area as directed 2 (Two) Times a Day With Meals.     • Insulin Pen Needle (BD PEN NEEDLE BYRON U/F) 32G X 4 MM misc Inject 1 each under the skin 3 (Three) Times a Day Before Meals. 300 each 3   • Lancets (ACCU-CHEK MULTICLIX) lancets TEST THREE TIMES DAILY 300 each 3   • losartan (COZAAR) 25 MG tablet Take 1 tablet by mouth Daily. 90 tablet 1   • metFORMIN ER (GLUCOPHAGE-XR) 500 MG 24 hr tablet Take 1 tablet by mouth 2 (Two) Times a Day With Meals. 180 tablet 3     No current facility-administered medications on file prior to visit.       Allergies   Allergen Reactions   • Shellfish Allergy Hives        Review of Systems   Constitutional: Negative.    HENT: Negative.    Eyes: Negative.    Respiratory: Negative.    Cardiovascular: Negative.    Gastrointestinal: Negative.    Endocrine: Negative.    Genitourinary: Negative.    Musculoskeletal: Positive for arthralgias.   Skin: Negative.    Allergic/Immunologic: Negative.    Neurological:  "Negative.    Hematological: Negative.    Psychiatric/Behavioral: Negative.         The patient's Review of Systems was personally reviewed and confirmed as accurate.    Physical Exam  Pulse 102, height 160 cm (62.99\"), weight 124 kg (273 lb 3.2 oz), SpO2 99 %.    Body mass index is 48.41 kg/m².    GENERAL APPEARANCE: awake, alert, oriented, in no acute distress, well developed, well nourished and obese  LUNGS:  breathing nonlabored  EXTREMITIES: no clubbing, cyanosis  PERIPHERAL PULSES: palpable dorsalis pedis and posterior tibial pulses bilaterally.    GAIT:  Antalgic        ----------  Right Knee Exam:  ----------  ALIGNMENT: neutral  ----------  RANGE OF MOTION:  Decreased (0 - 100 degrees) with no extensor lag  LIGAMENTOUS STABILITY:   stable to varus and valgus stress at terminal extension and 30 degrees without any evidence of laxity  ----------  STRENGTH:  KNEE FLEXION 5/5  KNEE EXTENSION  5/5  ANKLE DORSIFLEXION  5/5  ANKLE PLANTARFLEXION  5/5  ----------  PAIN WITH PALPATION:pes bursa  KNEE EFFUSION: no  PAIN WITH KNEE ROM: yes  PATELLAR CREPITUS:  yes, asymptomatic  ----------  SENSATION TO LIGHT TOUCH:  DEEP PERONEAL/SUPERFICIAL PERONEAL/SURAL/SAPHENOUS/TIBIAL:    intact  ----------  EDEMA:  no  ERYTHEMA:    no  WOUNDS/INCISIONS:  no  _____________________________________________________________________  _____________________________________________________________________    RADIOGRAPHIC FINDINGS:   Indication: Right knee pain    Comparison: Todays xrays were compared to previous xrays from 1/17/2019    Knee films: mild to moderate varus osteoarthritis with slight medial compartment joint space narrowing, Moderate patellofemoral osteoarthritis. No significant changes compared to prior radiographs      Assessment/Plan:   Diagnosis Plan   1. Pes anserine bursitis  Large Joint Arthrocentesis: R knee pes bursa    meloxicam (MOBIC) 15 MG tablet   2. Primary osteoarthritis of right knee  XR Knee 4+ View Right "    meloxicam (MOBIC) 15 MG tablet     Patient has recurrent pes bursitis.  I discussed treatment options.  She is agreeable to a repeat cortisone injection pes bursa today.  In addition to this, I will prescribe her an anti-inflammatory.  She will follow-up as needed.    Procedure Note:  I discussed with the patient the potential benefits of performing a therapeutic injection of the right knee pes bursa as well as potential risks including but not limited to infection, swelling, pain, bleeding, bruising, nerve/vessel damage, skin color changes, transient elevation in blood glucose levels, and fat atrophy. After informed consent and after the area was prepped with alcohol, ethyl chloride was used to numb the skin. Via the anteromedial approach, 2 cc of 1% lidocaine, 2 cc of 0.5% Naropin and 1 cc of 40mg/ml of Kenalog were injected into the right knee pes bursa. The patient tolerated the procedure well. There were no complications. A sterile dressing was placed over the injection site.      Corey Cormier MD  10/24/19  9:51 AM

## 2019-10-24 NOTE — PROGRESS NOTES
Procedure   Large Joint Arthrocentesis: R knee pes bursa  Date/Time: 10/24/2019 9:43 AM  Consent given by: patient  Site marked: site marked  Timeout: Immediately prior to procedure a time out was called to verify the correct patient, procedure, equipment, support staff and site/side marked as required   Supporting Documentation  Indications: pain   Procedure Details  Location: knee - R knee  Preparation: Patient was prepped and draped in the usual sterile fashion  Needle size: 22 G  Approach: anterolateral  Medications administered: 80 mg triamcinolone acetonide 40 MG/ML; 2 mL lidocaine PF 1% 1 % (Bupivacaine 0.25% 2.5mg/mL c NDC: 55150-167-10 LOT: [id number] EXP: 04/01/2022)  Patient tolerance: patient tolerated the procedure well with no immediate complications

## 2019-10-27 DIAGNOSIS — E11.22 TYPE 2 DIABETES MELLITUS WITH STAGE 3 CHRONIC KIDNEY DISEASE, WITH LONG-TERM CURRENT USE OF INSULIN (HCC): ICD-10-CM

## 2019-10-27 DIAGNOSIS — E78.2 MIXED HYPERLIPIDEMIA: ICD-10-CM

## 2019-10-27 DIAGNOSIS — Z79.4 TYPE 2 DIABETES MELLITUS WITH STAGE 3 CHRONIC KIDNEY DISEASE, WITH LONG-TERM CURRENT USE OF INSULIN (HCC): ICD-10-CM

## 2019-10-27 DIAGNOSIS — N18.30 TYPE 2 DIABETES MELLITUS WITH STAGE 3 CHRONIC KIDNEY DISEASE, WITH LONG-TERM CURRENT USE OF INSULIN (HCC): ICD-10-CM

## 2019-10-27 RX ORDER — ATORVASTATIN CALCIUM 10 MG/1
TABLET, FILM COATED ORAL
Qty: 30 TABLET | Refills: 0 | Status: SHIPPED | OUTPATIENT
Start: 2019-10-27 | End: 2019-11-20 | Stop reason: SDUPTHER

## 2019-10-27 RX ORDER — METFORMIN HYDROCHLORIDE 500 MG/1
TABLET, EXTENDED RELEASE ORAL
Qty: 60 TABLET | Refills: 0 | Status: SHIPPED | OUTPATIENT
Start: 2019-10-27 | End: 2019-11-20 | Stop reason: SDUPTHER

## 2019-11-20 ENCOUNTER — OFFICE VISIT (OUTPATIENT)
Dept: ENDOCRINOLOGY | Facility: CLINIC | Age: 64
End: 2019-11-20

## 2019-11-20 VITALS
SYSTOLIC BLOOD PRESSURE: 132 MMHG | BODY MASS INDEX: 48.2 KG/M2 | WEIGHT: 272 LBS | DIASTOLIC BLOOD PRESSURE: 90 MMHG | OXYGEN SATURATION: 98 % | HEART RATE: 87 BPM | HEIGHT: 63 IN

## 2019-11-20 DIAGNOSIS — E78.2 MIXED HYPERLIPIDEMIA: ICD-10-CM

## 2019-11-20 DIAGNOSIS — I10 ESSENTIAL HYPERTENSION: ICD-10-CM

## 2019-11-20 DIAGNOSIS — IMO0002 UNCONTROLLED TYPE 2 DIABETES MELLITUS WITH STAGE 3 CHRONIC KIDNEY DISEASE, WITH LONG-TERM CURRENT USE OF INSULIN: Primary | ICD-10-CM

## 2019-11-20 LAB
GLUCOSE BLDC GLUCOMTR-MCNC: 161 MG/DL (ref 70–130)
HBA1C MFR BLD: 7.9 %

## 2019-11-20 PROCEDURE — 82947 ASSAY GLUCOSE BLOOD QUANT: CPT | Performed by: INTERNAL MEDICINE

## 2019-11-20 PROCEDURE — 99214 OFFICE O/P EST MOD 30 MIN: CPT | Performed by: INTERNAL MEDICINE

## 2019-11-20 PROCEDURE — 83036 HEMOGLOBIN GLYCOSYLATED A1C: CPT | Performed by: INTERNAL MEDICINE

## 2019-11-20 RX ORDER — INSULIN LISPRO 100 [IU]/ML
INJECTION, SUSPENSION SUBCUTANEOUS
Qty: 30 PEN | Refills: 3 | Status: SHIPPED | OUTPATIENT
Start: 2019-11-20 | End: 2019-12-02 | Stop reason: CLARIF

## 2019-11-20 RX ORDER — LOSARTAN POTASSIUM 50 MG/1
50 TABLET ORAL DAILY
Qty: 90 TABLET | Refills: 3 | Status: SHIPPED | OUTPATIENT
Start: 2019-11-20 | End: 2021-01-11

## 2019-11-20 RX ORDER — METFORMIN HYDROCHLORIDE 500 MG/1
500 TABLET, EXTENDED RELEASE ORAL 2 TIMES DAILY WITH MEALS
Qty: 180 TABLET | Refills: 3 | Status: SHIPPED | OUTPATIENT
Start: 2019-11-20 | End: 2021-01-11

## 2019-11-20 RX ORDER — ALLOPURINOL 300 MG/1
300 TABLET ORAL DAILY
COMMUNITY

## 2019-11-20 RX ORDER — ATORVASTATIN CALCIUM 10 MG/1
10 TABLET, FILM COATED ORAL DAILY
Qty: 90 TABLET | Refills: 3 | Status: SHIPPED | OUTPATIENT
Start: 2019-11-20 | End: 2021-02-04

## 2019-11-20 RX ORDER — LANCETS
EACH MISCELLANEOUS
Qty: 300 EACH | Refills: 3 | Status: SHIPPED | OUTPATIENT
Start: 2019-11-20

## 2019-11-20 RX ORDER — BLOOD SUGAR DIAGNOSTIC
STRIP MISCELLANEOUS
Qty: 300 EACH | Refills: 3 | Status: SHIPPED | OUTPATIENT
Start: 2019-11-20

## 2019-11-21 LAB
ALBUMIN SERPL-MCNC: 4.5 G/DL (ref 3.6–4.8)
ALBUMIN/CREAT UR: 86.1 MG/G CREAT (ref 0–30)
ALBUMIN/GLOB SERPL: 1.5 {RATIO} (ref 1.2–2.2)
ALP SERPL-CCNC: 168 IU/L (ref 39–117)
ALT SERPL-CCNC: 13 IU/L (ref 0–32)
AST SERPL-CCNC: 12 IU/L (ref 0–40)
BILIRUB SERPL-MCNC: 0.4 MG/DL (ref 0–1.2)
BUN SERPL-MCNC: 23 MG/DL (ref 8–27)
BUN/CREAT SERPL: 19 (ref 12–28)
CALCIUM SERPL-MCNC: 10 MG/DL (ref 8.7–10.3)
CHLORIDE SERPL-SCNC: 101 MMOL/L (ref 96–106)
CHOLEST SERPL-MCNC: 139 MG/DL (ref 100–199)
CO2 SERPL-SCNC: 22 MMOL/L (ref 20–29)
CREAT SERPL-MCNC: 1.24 MG/DL (ref 0.57–1)
CREAT UR-MCNC: 210.2 MG/DL
ERYTHROCYTE [DISTWIDTH] IN BLOOD BY AUTOMATED COUNT: 15.7 % (ref 12.3–15.4)
GLOBULIN SER CALC-MCNC: 3.1 G/DL (ref 1.5–4.5)
GLUCOSE SERPL-MCNC: ABNORMAL MG/DL
HCT VFR BLD AUTO: 41.8 % (ref 34–46.6)
HDLC SERPL-MCNC: 52 MG/DL
HGB BLD-MCNC: 13.6 G/DL (ref 11.1–15.9)
LDLC SERPL CALC-MCNC: 66 MG/DL (ref 0–99)
MCH RBC QN AUTO: 26.7 PG (ref 26.6–33)
MCHC RBC AUTO-ENTMCNC: 32.5 G/DL (ref 31.5–35.7)
MCV RBC AUTO: 82 FL (ref 79–97)
MICROALBUMIN UR-MCNC: 180.9 UG/ML
PLATELET # BLD AUTO: 301 X10E3/UL (ref 150–450)
POTASSIUM SERPL-SCNC: ABNORMAL MMOL/L
PROT SERPL-MCNC: 7.6 G/DL (ref 6–8.5)
RBC # BLD AUTO: 5.09 X10E6/UL (ref 3.77–5.28)
SODIUM SERPL-SCNC: 143 MMOL/L (ref 134–144)
TRIGL SERPL-MCNC: 106 MG/DL (ref 0–149)
TSH SERPL DL<=0.005 MIU/L-ACNC: 3.22 UIU/ML (ref 0.45–4.5)
VLDLC SERPL CALC-MCNC: 21 MG/DL (ref 5–40)
WBC # BLD AUTO: 10.9 X10E3/UL (ref 3.4–10.8)

## 2019-12-02 DIAGNOSIS — IMO0002 UNCONTROLLED TYPE 2 DIABETES MELLITUS WITH STAGE 3 CHRONIC KIDNEY DISEASE, WITH LONG-TERM CURRENT USE OF INSULIN: Primary | ICD-10-CM

## 2020-02-28 ENCOUNTER — OFFICE VISIT (OUTPATIENT)
Dept: SLEEP MEDICINE | Facility: HOSPITAL | Age: 65
End: 2020-02-28

## 2020-02-28 VITALS
WEIGHT: 277 LBS | OXYGEN SATURATION: 96 % | HEIGHT: 63 IN | SYSTOLIC BLOOD PRESSURE: 171 MMHG | DIASTOLIC BLOOD PRESSURE: 84 MMHG | BODY MASS INDEX: 49.08 KG/M2 | HEART RATE: 92 BPM

## 2020-02-28 DIAGNOSIS — G47.33 OSA (OBSTRUCTIVE SLEEP APNEA): Primary | ICD-10-CM

## 2020-02-28 PROCEDURE — 99212 OFFICE O/P EST SF 10 MIN: CPT | Performed by: NURSE PRACTITIONER

## 2020-03-03 ENCOUNTER — TELEPHONE (OUTPATIENT)
Dept: ENDOCRINOLOGY | Facility: CLINIC | Age: 65
End: 2020-03-03

## 2020-03-03 DIAGNOSIS — IMO0002 UNCONTROLLED TYPE 2 DIABETES MELLITUS WITH STAGE 3 CHRONIC KIDNEY DISEASE, WITH LONG-TERM CURRENT USE OF INSULIN: Primary | ICD-10-CM

## 2020-03-03 RX ORDER — INSULIN LISPRO 100 [IU]/ML
INJECTION, SUSPENSION SUBCUTANEOUS
Qty: 30 PEN | Refills: 3 | Status: SHIPPED | OUTPATIENT
Start: 2020-03-03 | End: 2020-08-19 | Stop reason: SDUPTHER

## 2020-03-03 NOTE — TELEPHONE ENCOUNTER
Spoke with patient concerning her insulin. Pt states that her insurance is requiring her to change her formulary to Humalog with a 40.00 copay. Pt states that if she stays on the novolog it will cost her 462.00 a month. Pt also stated that she has had no problems in the past with the humalog and was ok to switch. Informed patient that Dr. Roberts is out of the office today and I would send a message . Pt verbalized understanding . Please advise?

## 2020-03-03 NOTE — TELEPHONE ENCOUNTER
PATIENT HAS NEW INSURANCE AND IS WANTING A PRESCRIPTION FOR HUMALOG MIX 75/25. ITS GOING TO COST MORE FOR HER TO STAY ON NOVOLOG. PATIENTS NUMBER [phone]. PATIENT IS NEEDING REFILLS AS SHE IS ALMOST OUT OF MEDICATION.

## 2020-03-04 NOTE — TELEPHONE ENCOUNTER
Guillermo,     I sent a new prescription for the Humalog 75/25 kwikpens to her pharmacy. Please let her know.     Thanks  MD

## 2020-04-14 DIAGNOSIS — I10 ESSENTIAL HYPERTENSION: ICD-10-CM

## 2020-04-14 RX ORDER — LOSARTAN POTASSIUM 25 MG/1
TABLET ORAL
Qty: 90 TABLET | Refills: 0 | OUTPATIENT
Start: 2020-04-14

## 2020-06-15 ENCOUNTER — OFFICE VISIT (OUTPATIENT)
Dept: ENDOCRINOLOGY | Facility: CLINIC | Age: 65
End: 2020-06-15

## 2020-06-15 VITALS
WEIGHT: 284 LBS | SYSTOLIC BLOOD PRESSURE: 122 MMHG | HEART RATE: 79 BPM | OXYGEN SATURATION: 99 % | DIASTOLIC BLOOD PRESSURE: 88 MMHG | HEIGHT: 63 IN | BODY MASS INDEX: 50.32 KG/M2

## 2020-06-15 DIAGNOSIS — IMO0002 UNCONTROLLED TYPE 2 DIABETES MELLITUS WITH STAGE 3 CHRONIC KIDNEY DISEASE, WITH LONG-TERM CURRENT USE OF INSULIN: Primary | ICD-10-CM

## 2020-06-15 DIAGNOSIS — I10 ESSENTIAL HYPERTENSION: ICD-10-CM

## 2020-06-15 DIAGNOSIS — E78.2 MIXED HYPERLIPIDEMIA: ICD-10-CM

## 2020-06-15 LAB
GLUCOSE BLDC GLUCOMTR-MCNC: 225 MG/DL (ref 70–130)
HBA1C MFR BLD: 8.5 %

## 2020-06-15 PROCEDURE — 83036 HEMOGLOBIN GLYCOSYLATED A1C: CPT | Performed by: INTERNAL MEDICINE

## 2020-06-15 PROCEDURE — 99214 OFFICE O/P EST MOD 30 MIN: CPT | Performed by: INTERNAL MEDICINE

## 2020-06-15 PROCEDURE — 82947 ASSAY GLUCOSE BLOOD QUANT: CPT | Performed by: INTERNAL MEDICINE

## 2020-06-15 NOTE — PROGRESS NOTES
Chief Complaint   Patient presents with   • Diabetes     DM2 f/u        HPI:   Masha Forde is a 65 y.o.female who returns to [facility] for f/u evaluation of her Type 2 DM. Last clinic visit 11/20/2019. Her history is as follows:    Interim Events:   - pt has not received letter that her metformin ER formulation has been recalled  - Reports more food excursion / less activity due to COVID. Noting more hyperglycemia.  - has not been splitting her daytime dose of insulin (see below)    1) Type 2 diabetes with associated complications of CKD, gastroparesis:  - diagnosed with DM in her mid 40's     Current DM Medications:  - Novolog 70/30: 40 units ac BK, 58 units ac dinner (had been on 10 / 30 / 58)  - Metformin  mg BID (higher dose caused diarrhea)    Glucometer Review: forgot meter for review today  - checking BG 3 times a day  Reports AM BG's 170's - 200's  Reports PM BG's 140's- 170's  Rare hypoglycemia    DM Health Maintenance:  Ophtho: Last visit - 08/2018 - normal, [facility] [facility]  Podiatry: Last visit - none  Monofilament / foot exam: due  Lipids: (11/2019): Tchol 139, , HDL 52, LDL 66 -  on lipitor 10 mg daily  Urine Microalb/Cr ratio: (11/2019) 86.1, has CKD stage 3, on an ARB for HTN  TSH: (11/2019) 3.220  CMP: (11/2019) Cr 1.24, GFR 46, LFT's WNL  CBC: (11/2019) Hgb 13.6  Aspirin: N/A    2) h/o gastroparesis due to DM:   - has not followed up with Dr. Maryuri Mauricio, [facility] as her symptoms are stable with dietary changes  - not on medication at this time     3) essential HTN:   - on losartan 50 mcg daily    Other history:  - h/o Uric Acid nephrolithiasis. S/p lithotripsy on 06/13/2019. On allopurinol    Review of Systems   Constitutional: Negative for fatigue.        Mild wt gain   HENT: Negative.    Eyes: Negative.  Negative for visual disturbance.   Respiratory: Positive for shortness of breath (on exertion).    Cardiovascular: Positive for leg swelling. Negative for chest pain.       "  Bilateral venous stasis to lower extremities   Gastrointestinal: Positive for nausea (intermittent). Negative for constipation, diarrhea and vomiting.   Endocrine: Negative.  Negative for cold intolerance, polydipsia and polyuria.   Genitourinary: Negative.    Musculoskeletal:        Small, nontender nodules present in distal parts of her fingers due to her GA   Skin: Negative.  Negative for rash and wound.   Neurological: Negative.  Negative for numbness and headaches.   Hematological: Negative.    Psychiatric/Behavioral:        H/o Anxiety  depression     The following portions of the patient's history were reviewed and updated as appropriate: allergies, current medications, past family history, past medical history, past social history, past surgical history and problem list.    /88   Pulse 79   Ht 160 cm (63\")   Wt 129 kg (284 lb)   SpO2 99%   BMI 50.31 kg/m²   Physical Exam   Constitutional: She is oriented to person, place, and time. She appears well-developed. No distress.   obese   HENT:   Head: Normocephalic.   Eyes: Pupils are equal, round, and reactive to light. Conjunctivae and EOM are normal.   Neck: No tracheal deviation present. No thyromegaly present.   No palpable thyroid nodules     Cardiovascular: Normal rate, regular rhythm and normal heart sounds.   No murmur heard.  Pulmonary/Chest: Effort normal and breath sounds normal. No respiratory distress.   Abdominal: Soft. Bowel sounds are normal.   No scarring at insulin injection sites     Musculoskeletal: She exhibits edema (+1 lower extremity edema).   Lymphadenopathy:     She has no cervical adenopathy.   Neurological: She is alert and oriented to person, place, and time. No cranial nerve deficit.   Skin: Skin is dry. She is not diaphoretic. No erythema.   Psychiatric: She has a normal mood and affect. Her behavior is normal.   Vitals reviewed.    LABS/IMAGING:   Results for orders placed or performed in visit on 06/15/20   POC " Glucose Fingerstick   Result Value Ref Range    Glucose 225 (A) 70 - 130 mg/dL   POC Glycosylated Hemoglobin (Hb A1C)   Result Value Ref Range    Hemoglobin A1C 8.5 %       ASSESSMENT/PLAN:  1) Type 2 Diabetes with associated complication of CKD stage 3, gastroparesis: uncontrolled, Hgb A1C% 8.5  today up from 7.9 last visit.  - continue metformin  mg BID in setting of mod CKD. (d/w pt okay to use per new FDA recommendation)  - increase Novolog 70/30: 40 units with breakfast, 62 units with dinner.   Pt to take 4 extra units if eating a restaurant meal.    - Adding Jardiance 10 mg qAM. Samples given to patient. Reviewed potential side effects. Encouraged pt to increase water intake. Pt to call for Rx if medication is tolerated.    2) essential HTN: controlled   - on losartan 50 mg daily    3) mixed hyperlipidemia: controlled  Lipids: (11/2019): Tchol 139, , HDL 52, LDL 66 -  on lipitor 10 mg daily    DM health maintenance labs completed 11/2019 reviewed    Counseling was given to patient for the following topics:  instructions for management and risks and benefits of treatment options, see details in assessment/plan. Total face to face time of the encounter was 30 minutes and 20 minutes was spent counseling.    RTC 4 months    Signed: Ruthie Roberts MD

## 2020-08-19 ENCOUNTER — TELEPHONE (OUTPATIENT)
Dept: ENDOCRINOLOGY | Facility: CLINIC | Age: 65
End: 2020-08-19

## 2020-08-19 DIAGNOSIS — IMO0002 UNCONTROLLED TYPE 2 DIABETES MELLITUS WITH STAGE 3 CHRONIC KIDNEY DISEASE, WITH LONG-TERM CURRENT USE OF INSULIN: ICD-10-CM

## 2020-08-19 RX ORDER — INSULIN LISPRO 100 [IU]/ML
INJECTION, SUSPENSION SUBCUTANEOUS
Qty: 30 PEN | Refills: 3 | Status: SHIPPED | OUTPATIENT
Start: 2020-08-19

## 2020-08-19 NOTE — TELEPHONE ENCOUNTER
PATIENT IS REQUESTING REFILL ON HUMALOG MIX PENS AND PEN NEEDLES. PLEASE SEND TO [facility] IN [place].

## 2020-10-12 ENCOUNTER — TRANSCRIBE ORDERS (OUTPATIENT)
Dept: ADMINISTRATIVE | Facility: HOSPITAL | Age: 65
End: 2020-10-12

## 2020-10-12 DIAGNOSIS — Z12.31 VISIT FOR SCREENING MAMMOGRAM: Primary | ICD-10-CM

## 2020-12-30 ENCOUNTER — HOSPITAL ENCOUNTER (OUTPATIENT)
Dept: MAMMOGRAPHY | Facility: HOSPITAL | Age: 65
Discharge: HOME | End: 2020-12-30
Admitting: FAMILY MEDICINE

## 2020-12-30 DIAGNOSIS — Z12.31 VISIT FOR SCREENING MAMMOGRAM: ICD-10-CM

## 2020-12-30 PROCEDURE — 77067 SCR MAMMO BI INCL CAD: CPT | Performed by: RADIOLOGY

## 2020-12-30 PROCEDURE — 77063 BREAST TOMOSYNTHESIS BI: CPT | Performed by: RADIOLOGY

## 2020-12-30 PROCEDURE — 77063 BREAST TOMOSYNTHESIS BI: CPT

## 2020-12-30 PROCEDURE — 77067 SCR MAMMO BI INCL CAD: CPT

## 2021-01-10 DIAGNOSIS — I10 ESSENTIAL HYPERTENSION: ICD-10-CM

## 2021-01-10 DIAGNOSIS — IMO0002 UNCONTROLLED TYPE 2 DIABETES MELLITUS WITH STAGE 3 CHRONIC KIDNEY DISEASE, WITH LONG-TERM CURRENT USE OF INSULIN: ICD-10-CM

## 2021-01-11 RX ORDER — LOSARTAN POTASSIUM 50 MG/1
TABLET ORAL
Qty: 90 TABLET | Refills: 1 | Status: SHIPPED | OUTPATIENT
Start: 2021-01-11 | End: 2022-03-22 | Stop reason: ALTCHOICE

## 2021-01-11 RX ORDER — METFORMIN HYDROCHLORIDE 500 MG/1
TABLET, EXTENDED RELEASE ORAL
Qty: 180 TABLET | Refills: 1 | Status: SHIPPED | OUTPATIENT
Start: 2021-01-11

## 2021-01-15 ENCOUNTER — APPOINTMENT (OUTPATIENT)
Dept: MAMMOGRAPHY | Facility: HOSPITAL | Age: 66
End: 2021-01-15

## 2021-02-04 DIAGNOSIS — E78.2 MIXED HYPERLIPIDEMIA: ICD-10-CM

## 2021-02-04 RX ORDER — ATORVASTATIN CALCIUM 10 MG/1
TABLET, FILM COATED ORAL
Qty: 90 TABLET | Refills: 1 | Status: SHIPPED | OUTPATIENT
Start: 2021-02-04

## 2021-10-12 ENCOUNTER — OFFICE VISIT (OUTPATIENT)
Dept: SLEEP MEDICINE | Facility: HOSPITAL | Age: 66
End: 2021-10-12

## 2021-10-12 VITALS
OXYGEN SATURATION: 98 % | WEIGHT: 260.6 LBS | BODY MASS INDEX: 46.18 KG/M2 | HEART RATE: 73 BPM | DIASTOLIC BLOOD PRESSURE: 82 MMHG | SYSTOLIC BLOOD PRESSURE: 171 MMHG | HEIGHT: 63 IN

## 2021-10-12 DIAGNOSIS — G47.33 OSA (OBSTRUCTIVE SLEEP APNEA): Primary | ICD-10-CM

## 2021-10-12 PROCEDURE — 99213 OFFICE O/P EST LOW 20 MIN: CPT | Performed by: NURSE PRACTITIONER

## 2021-10-12 RX ORDER — DULAGLUTIDE 3 MG/.5ML
INJECTION, SOLUTION SUBCUTANEOUS
COMMUNITY
Start: 2021-09-30 | End: 2022-09-15

## 2021-10-12 NOTE — PROGRESS NOTES
"    Chief Complaint:   Chief Complaint   Patient presents with   • Follow-up       HPI:    Masha Forde is a 66 y.o. female here for follow-up of sleep apnea.  Patient has a history of hypertension, hyperlipidemia, Raynaud's syndrome, morbid obesity, diabetes, GERD, chronic kidney disease, osteoarthritis and sleep apnea.  Patient was last seen 2/28/2020.  Patient states she continues to do well with CPAP therapy.  Patient is sleeping 7 to 8 hours nightly and feels rested upon awakening.  Patient will go to sleep within 5 minutes and does get up x1.  Patient has an Central score of 3/24.  Patient states \"I cannot sleep without it.\"  She has no concerns or complaints today and wishes to continue therapy.    Current medications are:   Current Outpatient Medications:   •  ACCU-CHEK CINDY PLUS test strip, TEST THREE TIMES DAILY, Disp: 300 each, Rfl: 3  •  allopurinol (ZYLOPRIM) 300 MG tablet, Take 300 mg by mouth Daily., Disp: , Rfl:   •  atorvastatin (LIPITOR) 10 MG tablet, TAKE ONE TABLET BY MOUTH DAILY, Disp: 90 tablet, Rfl: 1  •  Blood Glucose Monitoring Suppl (ACCU-CHEK CINDY PLUS) W/DEVICE kit, TEST THREE TIMES DAILY, Disp: 1 kit, Rfl: 0  •  Empagliflozin (Jardiance) 10 MG tablet, Take 10 mg by mouth Every Morning., Disp: 30 tablet, Rfl: 0  •  HUMALOG MIX 75/25 KWIKPEN (75-25) 100 UNIT/ML suspension pen-injector pen, Inject 40 units AC breakfast, and 62 units AC supper, Disp: 30 pen, Rfl: 3  •  Insulin Pen Needle (BD Pen Needle Pina U/F) 32G X 4 MM misc, Inject 1 each under the skin into the appropriate area as directed 2 (Two) Times a Day Before Meals., Disp: 200 each, Rfl: 3  •  Lancets (ACCU-CHEK MULTICLIX) lancets, TEST THREE TIMES DAILY, Disp: 300 each, Rfl: 3  •  losartan (COZAAR) 50 MG tablet, TAKE ONE TABLET BY MOUTH DAILY, Disp: 90 tablet, Rfl: 1  •  meloxicam (MOBIC) 15 MG tablet, 1 PO Daily with food., Disp: 60 tablet, Rfl: 0  •  metFORMIN ER (GLUCOPHAGE-XR) 500 MG 24 hr tablet, TAKE ONE TABLET BY " MOUTH TWICE A DAY WITH MEALS, Disp: 180 tablet, Rfl: 1  •  Trulicity 3 MG/0.5ML solution pen-injector, , Disp: , Rfl: .      The patient's relevant past medical, surgical, family and social history were reviewed and updated in Epic as appropriate.       Review of Systems   Eyes: Positive for visual disturbance.   Respiratory: Positive for apnea.    Gastrointestinal:        HEARTBURN   Musculoskeletal: Positive for arthralgias, back pain, gait problem, joint swelling and myalgias.   Allergic/Immunologic: Positive for food allergies.   Psychiatric/Behavioral: Positive for sleep disturbance.   All other systems reviewed and are negative.        Objective:    Physical Exam  Constitutional:       Appearance: Normal appearance.   HENT:      Head: Normocephalic and atraumatic.      Mouth/Throat:      Mouth: Mucous membranes are moist.      Pharynx: Oropharynx is clear.      Comments: Mallampati 1 anatomy  Eyes:      Conjunctiva/sclera: Conjunctivae normal.   Cardiovascular:      Rate and Rhythm: Normal rate and regular rhythm.   Pulmonary:      Effort: Pulmonary effort is normal.      Breath sounds: Normal breath sounds.   Skin:     General: Skin is warm and dry.   Neurological:      Mental Status: She is alert and oriented to person, place, and time.   Psychiatric:         Mood and Affect: Mood normal.         Behavior: Behavior normal.         Thought Content: Thought content normal.         Judgment: Judgment normal.       84/90 days of use  Greater than 4-hour use 87.8  90% pressure 9.0  AHI of 2.3  Settings 8-18    ASSESSMENT/PLAN    Diagnoses and all orders for this visit:    1. JEANNETTE (obstructive sleep apnea) (Primary)            1. Counseled patient regarding multimodal approach with healthy nutrition, healthy sleep, regular physical activity, social activities, counseling, and medications. Encouraged to practice lateral sleep position. Avoid alcohol and sedatives close to bedtime.  Refill supplies x1 year.  Return  to clinic 1 year or sooner symptoms warrant.     I have reviewed the results of my evaluation and impression and discussed my recommendations in detail with the patient.      Signed by  Eunice Leonard, MIGUE    October 12, 2021      CC: Danae Willingham MD (Inactive)          No ref. provider found

## 2022-03-22 ENCOUNTER — OFFICE VISIT (OUTPATIENT)
Dept: ORTHOPEDIC SURGERY | Facility: CLINIC | Age: 67
End: 2022-03-22

## 2022-03-22 VITALS
DIASTOLIC BLOOD PRESSURE: 76 MMHG | BODY MASS INDEX: 46.07 KG/M2 | SYSTOLIC BLOOD PRESSURE: 132 MMHG | HEIGHT: 63 IN | WEIGHT: 260 LBS

## 2022-03-22 DIAGNOSIS — M25.511 RIGHT SHOULDER PAIN, UNSPECIFIED CHRONICITY: Primary | ICD-10-CM

## 2022-03-22 DIAGNOSIS — IMO0002 DISORDER OF ROTATOR CUFF SYNDROME OF RIGHT SHOULDER AND ALLIED DISORDER: ICD-10-CM

## 2022-03-22 DIAGNOSIS — M19.011 ARTHRITIS OF RIGHT ACROMIOCLAVICULAR JOINT: ICD-10-CM

## 2022-03-22 PROCEDURE — 99214 OFFICE O/P EST MOD 30 MIN: CPT | Performed by: ORTHOPAEDIC SURGERY

## 2022-03-22 RX ORDER — LOSARTAN POTASSIUM 100 MG/1
TABLET ORAL
COMMUNITY
Start: 2022-01-01

## 2022-03-22 RX ORDER — DULAGLUTIDE 1.5 MG/.5ML
INJECTION, SOLUTION SUBCUTANEOUS
COMMUNITY
Start: 2022-01-19 | End: 2022-09-15

## 2022-03-22 RX ORDER — TRAMADOL HYDROCHLORIDE 200 MG/1
TABLET, FILM COATED, EXTENDED RELEASE ORAL
COMMUNITY
Start: 2022-01-04

## 2022-03-22 NOTE — PROGRESS NOTES
[facility] [place] Orthopaedic Surgery Clinic Note        Subjective     Pain of the Right Shoulder      HPI    Masha Forde is a 67 y.o. female who presents with new problem of: right shoulder pain.  Onset: atraumatic and gradual in nature. The issue has been ongoing for 3 month(s). Pain is a 7/10 on the pain scale. Pain is described as aching and throbbing. Associated symptoms include pain, swelling and giving way/buckling. The pain is worse with working; ice and pain medication and/or NSAID improve the pain. Previous treatments have included: NSAIDS, physical therapy and steroid injection (last injection approximately 1 month ago from her PCP Dr. Arzola).    I have reviewed the following portions of the patient's history:History of Present Illness and review of systems.       Patient is here today for evaluation of right shoulder and elbow pain.  She says things began with her elbow which has hurt for years.  She has been told this is tennis elbow.  About 3 months ago, she started to have arm pain and shoulder pain.  This radiates into her neck.  No history of numbness or tingling.  She has tried physical therapy at [facility] with Edilberto Cali in this she tells me has exacerbated things.  She has a history of diabetes.  She received an injection that helped her for 2 weeks by her PCP.  The injection was given about a month ago.  She is an insulin-dependent diabetic.      Past Medical History:   Diagnosis Date   • Cholelithiasis    • Diabetes mellitus (HCC)    • GERD (gastroesophageal reflux disease)    • Granuloma annulare     hands, s/p multiple lesion removal   • Hiatal hernia    • Hyperlipidemia    • Hypertension       Past Surgical History:   Procedure Laterality Date   • BREAST CYST EXCISION Right    • CHOLECYSTECTOMY     • COLONOSCOPY  2007   • CYSTOSCOPY W/ LASER LITHOTRIPSY  06/13/2019   • HAND SURGERY     • HYSTERECTOMY      partial; AGE 35-40   • KIDNEY STONE SURGERY  2016   • OOPHORECTOMY Right     AGE  35-40      Family History   Problem Relation Age of Onset   • Diabetes Mother    • COPD Mother    • Alzheimer's disease Mother    • Diabetes Father    • Heart attack Father    • Coronary artery disease Father    • Obesity Father    • Diabetes Sister    • Obesity Sister    • Diabetes Paternal Grandmother    • Lung cancer Sister    • Breast cancer Neg Hx    • Ovarian cancer Neg Hx      Social History     Socioeconomic History   • Marital status:    Tobacco Use   • Smoking status: Never Smoker   • Smokeless tobacco: Never Used   Substance and Sexual Activity   • Alcohol use: No   • Drug use: No   • Sexual activity: Defer     Comment: lives with       Current Outpatient Medications on File Prior to Visit   Medication Sig Dispense Refill   • ACCU-CHEK CINDY PLUS test strip TEST THREE TIMES DAILY 300 each 3   • allopurinol (ZYLOPRIM) 300 MG tablet Take 300 mg by mouth Daily.     • atorvastatin (LIPITOR) 10 MG tablet TAKE ONE TABLET BY MOUTH DAILY 90 tablet 1   • Blood Glucose Monitoring Suppl (ACCU-CHEK CINDY PLUS) W/DEVICE kit TEST THREE TIMES DAILY 1 kit 0   • Empagliflozin (Jardiance) 10 MG tablet Take 10 mg by mouth Every Morning. 30 tablet 0   • HUMALOG MIX 75/25 KWIKPEN (75-25) 100 UNIT/ML suspension pen-injector pen Inject 40 units AC breakfast, and 62 units AC supper 30 pen 3   • Insulin Pen Needle (BD Pen Needle Pina U/F) 32G X 4 MM misc Inject 1 each under the skin into the appropriate area as directed 2 (Two) Times a Day Before Meals. 200 each 3   • Lancets (ACCU-CHEK MULTICLIX) lancets TEST THREE TIMES DAILY 300 each 3   • losartan (COZAAR) 100 MG tablet      • meloxicam (MOBIC) 15 MG tablet 1 PO Daily with food. 60 tablet 0   • metFORMIN ER (GLUCOPHAGE-XR) 500 MG 24 hr tablet TAKE ONE TABLET BY MOUTH TWICE A DAY WITH MEALS 180 tablet 1   • traMADol HCl ER, Biphasic, (RYZOLT) 200 MG tablet sustained-release 24 hour      • Trulicity 1.5 MG/0.5ML solution pen-injector      • Trulicity 3 MG/0.5ML  "solution pen-injector      • [DISCONTINUED] losartan (COZAAR) 50 MG tablet TAKE ONE TABLET BY MOUTH DAILY 90 tablet 1     No current facility-administered medications on file prior to visit.      Allergies   Allergen Reactions   • Shellfish Allergy Hives          Review of Systems   Constitutional: Negative.    HENT: Negative.    Eyes: Negative.    Respiratory: Negative.    Cardiovascular: Negative.    Gastrointestinal: Negative.    Endocrine: Negative.    Genitourinary: Negative.    Musculoskeletal: Positive for arthralgias.   Skin: Negative.    Allergic/Immunologic: Negative.    Neurological: Negative.    Hematological: Negative.    Psychiatric/Behavioral: Negative.         I reviewed the patient's chief complaint, history of present illness, review of systems, past medical history, surgical history, family history, social history, medications and allergy list.        Objective      Physical Exam  /76   Ht 160 cm (63\")   Wt 118 kg (260 lb)   BMI 46.06 kg/m²     Body mass index is 46.06 kg/m².    General  Mental Status - alert  General Appearance - cooperative, well groomed, not in acute distress  Orientation - Oriented X3  Build & Nutrition - well developed and well nourished  Posture - normal posture  Gait - normal gait       Ortho Exam  Musculoskeletal   Upper Extremity   Right Shoulder     Inspection and Palpation:     Medial border scapular tenderness-none    AC Joint Tenderness -mild to moderate    Sensation is normal    Examination reveals no ecchymosis.        Strength and Tone:    Supraspinatus - 5/5 with pain    External Rotators-5/5    Infraspinatus - 5/5    Subscapularis - 5/5 with pain    Deltoid - 5/5     Range of Motion      RightShoulder:    Internal Rotation: ROM - L4    External Rotation: AROM - 60 degrees    Elevation through flexion: AROM -110 degrees (150)       Impingement   Right shoulder    Retana-Sorin impingement test positive    Neer impingement test negative     Functional " Testing   Right shoulder    AC crossover adduction test positive    Speeds test negative    Uppercut test negative    O'Briens test negative    Drop arm sign negative    Apprehension relocation negative      Imaging/Studies  Imaging Results (Last 24 Hours)     Procedure Component Value Units Date/Time    XR Shoulder 2+ View Right [[id number]] Resulted: 03/22/22 1348     Updated: 03/22/22 1349    Narrative:      Right Shoulder X-Ray    Indication: Pain    Study:  AP, axillary lateral, and scapular Y views    Comparison: None    Findings:  No acute fractures are visualized  No bony lesions are visualized.  Normal soft tissue appearance  AC joint: Severe joint space narrowing  Glenohumeral joint: Mild joint space narrowing  Acromion type: 2      Impression:    No acute bony abnormalities noted  Type II acromion  Severe AC joint space narrowing              Assessment    Assessment:  1. Right shoulder pain, unspecified chronicity    2. Disorder of rotator cuff syndrome of right shoulder and allied disorder    3. Arthritis of right acromioclavicular joint        Plan:  1. Continue over-the-counter medication as needed for discomfort  2. Severe right AC joint arthritis with rotator cuff syndrome--patient has failed PT and a corticosteroid injection.  I think it is reasonable to now get an MRI to see if there is a rotator cuff tear.  Patient's passive range of motion is good overall.  Diabetes needs to be kept in mind.  Follow-up to review the results of the MRI pain close attention to the integrity of the rotator cuff.        Henry Jones MD  03/22/22  14:04 EDT      Dictated Utilizing Dragon Dictation.

## 2022-04-25 ENCOUNTER — HOSPITAL ENCOUNTER (OUTPATIENT)
Dept: MRI IMAGING | Facility: HOSPITAL | Age: 67
Discharge: HOME | End: 2022-04-25
Admitting: ORTHOPAEDIC SURGERY

## 2022-04-25 DIAGNOSIS — M25.511 RIGHT SHOULDER PAIN, UNSPECIFIED CHRONICITY: ICD-10-CM

## 2022-04-25 DIAGNOSIS — IMO0002 DISORDER OF ROTATOR CUFF SYNDROME OF RIGHT SHOULDER AND ALLIED DISORDER: ICD-10-CM

## 2022-04-25 DIAGNOSIS — M19.011 ARTHRITIS OF RIGHT ACROMIOCLAVICULAR JOINT: ICD-10-CM

## 2022-04-25 PROCEDURE — 73221 MRI JOINT UPR EXTREM W/O DYE: CPT

## 2022-04-28 ENCOUNTER — OFFICE VISIT (OUTPATIENT)
Dept: ORTHOPEDIC SURGERY | Facility: CLINIC | Age: 67
End: 2022-04-28

## 2022-04-28 VITALS
SYSTOLIC BLOOD PRESSURE: 150 MMHG | DIASTOLIC BLOOD PRESSURE: 90 MMHG | BODY MASS INDEX: 45.89 KG/M2 | HEIGHT: 63 IN | WEIGHT: 259 LBS

## 2022-04-28 DIAGNOSIS — M19.011 ARTHRITIS OF RIGHT ACROMIOCLAVICULAR JOINT: Primary | ICD-10-CM

## 2022-04-28 DIAGNOSIS — M54.2 CERVICALGIA: ICD-10-CM

## 2022-04-28 DIAGNOSIS — M75.121 COMPLETE TEAR OF RIGHT ROTATOR CUFF, UNSPECIFIED WHETHER TRAUMATIC: ICD-10-CM

## 2022-04-28 PROCEDURE — 99214 OFFICE O/P EST MOD 30 MIN: CPT | Performed by: ORTHOPAEDIC SURGERY

## 2022-04-28 PROCEDURE — 20610 DRAIN/INJ JOINT/BURSA W/O US: CPT | Performed by: ORTHOPAEDIC SURGERY

## 2022-04-28 RX ORDER — HYDROCODONE BITARTRATE AND ACETAMINOPHEN 7.5; 325 MG/1; MG/1
TABLET ORAL
COMMUNITY
Start: 2022-04-25

## 2022-04-28 RX ORDER — TRIAMCINOLONE ACETONIDE 40 MG/ML
40 INJECTION, SUSPENSION INTRA-ARTICULAR; INTRAMUSCULAR
Status: COMPLETED | OUTPATIENT
Start: 2022-04-28 | End: 2022-04-28

## 2022-04-28 RX ORDER — LIDOCAINE HYDROCHLORIDE 10 MG/ML
3 INJECTION, SOLUTION EPIDURAL; INFILTRATION; INTRACAUDAL; PERINEURAL
Status: COMPLETED | OUTPATIENT
Start: 2022-04-28 | End: 2022-04-28

## 2022-04-28 RX ADMIN — TRIAMCINOLONE ACETONIDE 40 MG: 40 INJECTION, SUSPENSION INTRA-ARTICULAR; INTRAMUSCULAR at 12:26

## 2022-04-28 RX ADMIN — LIDOCAINE HYDROCHLORIDE 3 ML: 10 INJECTION, SOLUTION EPIDURAL; INFILTRATION; INTRACAUDAL; PERINEURAL at 12:26

## 2022-04-28 NOTE — PROGRESS NOTES
"    [facility] [place] Orthopaedic Surgery Clinic Note        Subjective     CC: Follow-up (5 week follow up --Right shoulder pain, unspecified chronicity; Disorder of rotator cuff syndrome of right shoulder and allied disorder; Arthritis of right acromioclavicular joint / / //)      YANETH Forde is a 67 y.o. female.  Patient returns for follow-up after the MRI of her right shoulder.  Symptoms are unchanged from her initial visit on 3/22/2022.  See that note for full history.    Overall, patient's symptoms are as above.    ROS:    Constiutional:Pt denies fever, chills, nausea, or vomiting.  MSK:as above        Objective      Past Medical History  Past Medical History:   Diagnosis Date   • Cholelithiasis    • Diabetes mellitus (HCC)    • GERD (gastroesophageal reflux disease)    • Granuloma annulare     hands, s/p multiple lesion removal   • Hiatal hernia    • Hyperlipidemia    • Hypertension          Physical Exam  /90   Ht 160 cm (62.99\")   Wt 117 kg (259 lb)   BMI 45.89 kg/m²     Body mass index is 45.89 kg/m².    Patient is well nourished and well developed.        Ortho Exam  Musculoskeletal   Upper Extremity   Right Shoulder     Inspection and Palpation:     Medial border scapular tenderness-none    AC Joint Tenderness -moderate    Sensation is normal    Examination reveals no ecchymosis.        Strength and Tone:    Supraspinatus - 5/5 with pain    External Rotators-5/5    Infraspinatus - 5/5    Subscapularis - 5/5 with pain    Deltoid - 5/5     Range of Motion      RightShoulder:    Internal Rotation: ROM - L4    External Rotation: AROM - 60 degrees    Elevation through flexion: AROM - 140 degrees        Impingement   Right shoulder    Retana-Sorin impingement test positive    Neer impingement test negative     Functional Testing   Right shoulder    AC crossover adduction test positive    Speeds test negative    Uppercut test negative    O'Briens test negative    Drop arm sign negative    Apprehension " relocation negative      Imaging/Labs/EMG Reviewed:  Imaging Results (Last 24 Hours)     ** No results found for the last 24 hours. **        MRI Shoulder Right Without Contrast  Narrative: DATE OF EXAM: 4/25/2022 1:48 PM     PROCEDURE: MRI SHOULDER RIGHT WO CONTRAST-     INDICATIONS: Shoulder pain, rotator cuff tear/impingement suspected;  M25.511-Pain in right shoulder; [id number]-Olwwadyd for concepts with  insufficient information to code with codable children; M19.011-Primary  osteoarthritis, right shoulder     COMPARISON: No comparisons available.     TECHNIQUE: Multiplanar/multisequence MRI of the right shoulder was  performed without the administration of contrast.     FINDINGS:  Severe changes of tendinopathy are noted throughout the rotator cuff.  There is associated edema throughout the supraspinatus and infraspinatus  components of the rotator cuff. There is a focal full-thickness tear  involving the anterior fibers of the supraspinatus component of the  cuff. The tear occurs approximately 7 mm proximal to the distal  attachment. There is no significant retraction of torn fibers. Increased  overlying bursal fluid is noted.     The visualization of the biceps tendon is somewhat limited due to  internal rotation of the humeral head. The biceps anchor appears to be  intact. There is no definitive full-thickness biceps tendon tear or  distal retraction. The tendon is seen in the expected position in the in  the intertubercular sulcus. No definitive labral tear is observed.     The glenohumeral joint is intact. Mild changes of osteoarthritis are  noted. There is no joint effusion.     The acromioclavicular joint is intact. Mild hypertrophic age-related  changes are noted. There is mild atrophy throughout the rotator cuff  musculature. No abnormal bone marrow signal seen. The cortical margins  are grossly intact. The surrounding soft tissues are unremarkable.     Impression: 1.  Severe changes of tendinopathy are  seen throughout the rotator cuff.  There is associated edema throughout the supraspinatus and infraspinatus  components.  2.  There is a focal full-thickness tear involving the anterior fibers  of the supraspinatus component just proximal to the distal attachment.  Increased bursal fluid is seen. There is no significant retraction of  torn fibers.  3.  No evidence for biceps tendon tear. No evidence for labral tear.  4.  Mild degenerative changes of the shoulder are noted.     This report was finalized on 4/26/2022 9:14 AM by Nahum Tran MD.     We reviewed images and report of the MRI above.  There is a small anterior full-thickness tear of the supraspinatus seen on 1-2 cuts only.  No retraction.  There is tendinopathy noted throughout the supraspinatus.  Some edema of the AC joint noted.  Biceps is at home.    Assessment    Assessment:  1. Arthritis of right acromioclavicular joint    2. Cervicalgia    3. Complete tear of right rotator cuff, unspecified whether traumatic        Plan:  1. Recommend over the counter anti-inflammatories for pain and/or swelling  2. Small full-thickness anterior supraspinatus tear in the face of tendinopathy and AC joint arthritis and cervicalgia--at this point, patient has been told that we do not recommend surgery at this point.  She cannot take the time away as well.  Plan will be for subacromial injection.  We talked about the TENS unit trial and prescription was written for her to go back to [facility] to get this done.  She may go to another facility.  See her back in 3 to 4 months we will see how she is doing overall.  Consider modalities to the AC joint if she gets no relief from the subacromial injection given today.  Cautiously guarded optimism currently.    Procedure Note:    I discussed with the patient the potential benefits of performing a therapeutic injections as well as potential risks including but not limited to infection, swelling, pain, bleeding, bruising,  nerve/vessel damage, skin color changes, transient elevation in blood glucose levels, and fat atrophy. After informed consent and after the areas were prepped with chlorhexadine soap, ethyl chloride was used to numb the skin. Via the posterolateral approach, 3mL of 1% lidocaine followed by 40mg of Kenalog were each injected into the subacromial space of the right shoulder. The patient tolerated the procedure well. There were no complications. A sterile dressing was placed over the injection sites.        Henry Jones MD  04/28/22  17:46 EDT      Dictated Utilizing Dragon Dictation.

## 2022-04-28 NOTE — PROGRESS NOTES
Procedure   - Large Joint Arthrocentesis: R subacromial bursa on 4/28/2022 12:26 PM  Indications: pain  Details: 25 G needle, anterolateral approach  Medications: 3 mL lidocaine PF 1% 1 %; 40 mg triamcinolone acetonide 40 MG/ML  Outcome: tolerated well, no immediate complications  Procedure, treatment alternatives, risks and benefits explained, specific risks discussed. Consent was given by the patient. Immediately prior to procedure a time out was called to verify the correct patient, procedure, equipment, support staff and site/side marked as required. Patient was prepped and draped in the usual sterile fashion.

## 2022-09-15 ENCOUNTER — OFFICE VISIT (OUTPATIENT)
Dept: ORTHOPEDIC SURGERY | Facility: CLINIC | Age: 67
End: 2022-09-15

## 2022-09-15 VITALS
BODY MASS INDEX: 45.71 KG/M2 | WEIGHT: 258 LBS | DIASTOLIC BLOOD PRESSURE: 72 MMHG | SYSTOLIC BLOOD PRESSURE: 124 MMHG | HEIGHT: 63 IN

## 2022-09-15 DIAGNOSIS — M19.011 ARTHRITIS OF RIGHT ACROMIOCLAVICULAR JOINT: Primary | ICD-10-CM

## 2022-09-15 DIAGNOSIS — M75.121 COMPLETE TEAR OF RIGHT ROTATOR CUFF, UNSPECIFIED WHETHER TRAUMATIC: ICD-10-CM

## 2022-09-15 DIAGNOSIS — M54.2 CERVICALGIA: ICD-10-CM

## 2022-09-15 PROCEDURE — 20610 DRAIN/INJ JOINT/BURSA W/O US: CPT | Performed by: ORTHOPAEDIC SURGERY

## 2022-09-15 RX ORDER — LIDOCAINE HYDROCHLORIDE 10 MG/ML
3 INJECTION, SOLUTION EPIDURAL; INFILTRATION; INTRACAUDAL; PERINEURAL
Status: COMPLETED | OUTPATIENT
Start: 2022-09-15 | End: 2022-09-15

## 2022-09-15 RX ORDER — TRIAMCINOLONE ACETONIDE 40 MG/ML
40 INJECTION, SUSPENSION INTRA-ARTICULAR; INTRAMUSCULAR
Status: COMPLETED | OUTPATIENT
Start: 2022-09-15 | End: 2022-09-15

## 2022-09-15 RX ORDER — SEMAGLUTIDE 1.34 MG/ML
INJECTION, SOLUTION SUBCUTANEOUS
COMMUNITY
Start: 2022-09-13

## 2022-09-15 RX ADMIN — LIDOCAINE HYDROCHLORIDE 3 ML: 10 INJECTION, SOLUTION EPIDURAL; INFILTRATION; INTRACAUDAL; PERINEURAL at 14:09

## 2022-09-15 RX ADMIN — TRIAMCINOLONE ACETONIDE 40 MG: 40 INJECTION, SUSPENSION INTRA-ARTICULAR; INTRAMUSCULAR at 14:09

## 2022-09-15 NOTE — PROGRESS NOTES
[facility]        Subjective     CC: Follow-up (5 month f/u Right Shoulder Pain/AC Jt OA, last SA cortisone 4/28/22 )      YANETH Forde is a 67 y.o. female.  Patient returns the office today for follow-up of her right AC joint arthritis and cervicalgia with rotator cuff tear.  Injected on 4/28/2022 subacromially.  She has gotten 3-1/2 months of relief.  Things have worn off and she is requesting another injection today.    Overall, patient's symptoms are as above.    ROS:    Constiutional:Pt denies fever, chills, nausea, or vomiting.  MSK:as above        Objective      Past Medical History  Past Medical History:   Diagnosis Date   • Cholelithiasis    • Diabetes mellitus (HCC)    • GERD (gastroesophageal reflux disease)    • Granuloma annulare     hands, s/p multiple lesion removal   • Hiatal hernia    • Hyperlipidemia    • Hypertension      Assessment    Assessment:  1. Arthritis of right acromioclavicular joint    2. Cervicalgia    3. Complete tear of right rotator cuff, unspecified whether traumatic        Plan:  1. Recommend over the counter anti-inflammatories for pain and/or swelling  2. Chronic right shoulder pain with cervicalgia and small anterior rotator cuff tear with AC joint arthritis--plan will be for repeat subacromial injection.  Follow-up in 4 months.    Procedure Note:    I discussed with the patient the potential benefits of performing a therapeutic injections as well as potential risks including but not limited to infection, swelling, pain, bleeding, bruising, nerve/vessel damage, skin color changes, transient elevation in blood glucose levels, and fat atrophy. After informed consent and after the areas were prepped with chlorhexadine soap, ethyl chloride was used to numb the skin. Via the posterolateral approach, 3mL of 1% lidocaine followed by 40mg of Kenalog were each injected into the subacromial space of the right shoulder. The patient tolerated the  procedure well. There were no complications. A sterile dressing was placed over the injection sites.        Henry Jones MD  09/15/22  14:46 EDT      Dictated Utilizing Dragon Dictation.

## 2022-09-15 NOTE — PROGRESS NOTES
Procedure   Large Joint Arthrocentesis: R subacromial bursa  Date/Time: 9/15/2022 2:09 PM  Consent given by: patient  Site marked: site marked  Timeout: Immediately prior to procedure a time out was called to verify the correct patient, procedure, equipment, support staff and site/side marked as required   Supporting Documentation  Indications: pain   Procedure Details  Location: shoulder - R subacromial bursa  Preparation: Patient was prepped and draped in the usual sterile fashion  Needle size: 22 G  Approach: anterolateral  Medications administered: 3 mL lidocaine PF 1% 1 %; 40 mg triamcinolone acetonide 40 MG/ML  Patient tolerance: patient tolerated the procedure well with no immediate complications

## 2022-10-24 ENCOUNTER — OFFICE VISIT (OUTPATIENT)
Dept: SLEEP MEDICINE | Facility: HOSPITAL | Age: 67
End: 2022-10-24

## 2022-10-24 VITALS
BODY MASS INDEX: 47.31 KG/M2 | HEIGHT: 63 IN | OXYGEN SATURATION: 97 % | DIASTOLIC BLOOD PRESSURE: 79 MMHG | SYSTOLIC BLOOD PRESSURE: 169 MMHG | WEIGHT: 267 LBS | HEART RATE: 85 BPM

## 2022-10-24 DIAGNOSIS — R03.0 ELEVATED BLOOD PRESSURE READING: ICD-10-CM

## 2022-10-24 DIAGNOSIS — E66.01 OBESITY, CLASS III, BMI 40-49.9 (MORBID OBESITY): ICD-10-CM

## 2022-10-24 DIAGNOSIS — G47.33 OSA (OBSTRUCTIVE SLEEP APNEA): Primary | ICD-10-CM

## 2022-10-24 PROCEDURE — 99214 OFFICE O/P EST MOD 30 MIN: CPT | Performed by: INTERNAL MEDICINE

## 2022-10-24 NOTE — PROGRESS NOTES
"     Follow Up Office Visit      Patient Name: Masha Forde    Chief Complaint:    Chief Complaint   Patient presents with   • Follow-up       History of Present Illness: Masha Forde is a 67 y.o. female who is here today for follow up of Sleep Apnea.     67-year-old female with a history of her diabetes which is not under very good control, dyslipidemia, hypertension presenting here for follow-up on sleep apnea.  Patient previously was diagnosed with obstructive sleep apnea and has been on CPAP therapy.  She states that she has been using her CPAP machine without fail for last 4 years.  Denies any problem with machine or the mask interface.  She states that she cannot sleep without her CPAP device.  Does feel rested when she wakes up in the morning.  She does feel  dryness of the mouth.  Looking through abdominal data she has pretty large leak.  States that recently mask was changed to a different size and that is causing the problem.  Advised to contact her [email or web address] company to get that mask changed back to smaller size.  She verbalized understanding.    Subjective      Review of Systems:   Review of Systems   Constitutional: Positive for fatigue.   HENT: Negative.    Respiratory: Negative.    Cardiovascular: Positive for leg swelling.   Gastrointestinal: Negative.    Endocrine: Negative.    Musculoskeletal: Positive for arthralgias.   Skin: Negative.    Neurological: Negative.    Hematological: Negative.    Psychiatric/Behavioral: Negative.    All other systems reviewed and are negative.      The following portions of the patient's history were reviewed and updated as appropriate: allergies, current medications, past family history, past medical history, past social history, past surgical history and problem list.    Objective     Physical Exam:  Vital Signs:   Vitals:    10/24/22 1404   BP: 169/79   Pulse: 85   SpO2: 97%   Weight: 121 kg (267 lb)   Height: 160 cm (63\")     Body mass index is 47.3 " kg/m².    Physical Exam  Vitals and nursing note reviewed.   Constitutional:       General: She is not in acute distress.     Appearance: She is well-developed. She is not diaphoretic.   HENT:      Head: Normocephalic and atraumatic.      Comments: Mallampati 3 airway     Nose: Nose normal.      Mouth/Throat:      Pharynx: No oropharyngeal exudate.   Eyes:      General:         Right eye: No discharge.         Left eye: No discharge.      Pupils: Pupils are equal, round, and reactive to light.   Neck:      Thyroid: No thyromegaly.      Trachea: No tracheal deviation.   Cardiovascular:      Rate and Rhythm: Normal rate and regular rhythm.      Heart sounds: Normal heart sounds. No murmur heard.    No friction rub. No gallop.   Pulmonary:      Effort: Pulmonary effort is normal. No respiratory distress.      Breath sounds: Normal breath sounds. No wheezing or rales.   Musculoskeletal:         General: No tenderness.      Right lower leg: Edema present.      Left lower leg: Edema present.   Neurological:      Mental Status: She is alert and oriented to person, place, and time.      Cranial Nerves: No cranial nerve deficit.      Coordination: Coordination normal.   Psychiatric:         Behavior: Behavior normal.         Thought Content: Thought content normal.         Judgment: Judgment normal.         Results Review:   Patient's previous home study results reviewed.  Patient had severe obstructive sleep apnea with AHI of 33.7.    CPAP download reviewed in detail with the patient.  Patient's compliance for more than 4 hours 96.7%.  AHI 3.3.  90th percentile pressure of 9 cm water.  Average usage is 7 hours and 5 minutes.  Large leak noted.    Assessment / Plan      Assessment:   Problem List Items Addressed This Visit    None  Visit Diagnoses     JEANNETTE (obstructive sleep apnea)    -  Primary    Relevant Orders    PAP Therapy    Obesity, Class III, BMI 40-49.9 (morbid obesity) (HCC)        Elevated blood pressure reading               Plan:   1.  Patient with history of severe obstructive sleep apnea on CPAP therapy. Compliance data shows excellent compliance and treated sleep apnea.  Continue same prescription.  No changes made in the pressure settings.  Prescription renewed.  2.  Will have patient contact [email or web address] company to get a better mask fit to improve on the air leak.  She verbalized understanding.  3.  Blood pressure is not under good control.  Will need to be monitored and medications adjusted.  Patient states that she is aggravated today because of her having some issues with home sale.  Advised to monitor blood pressure closely at home and if remains elevated above 130 systolic get her medications adjusted.  She verbalized understanding.  4.  Increasing activity and weight loss counseled.  5.  Continue follow-up with primary care regarding diabetes management.    Will continue to follow her closely in sleep clinic.    Follow Up:   Return in about 1 year (around 10/24/2023) for Recheck.    Discussed plan of care in detail with patient today. Patient verbally understands and agrees. I spent 30 minutes on this date of service. This time includes time spent by me in the following activities:preparing for the visit, reviewing tests, obtaining and/or reviewing a separately obtained history, performing a medically appropriate examination, counseling the patient, ordering CPAP supplies, and/or documenting information in the medical record. This time excludes other separate billable services such as interpretation of tests or procedures, if applicable.     Andre Burgess MD  Pulmonary Critical Care and Sleep Medicine

## 2023-10-30 ENCOUNTER — OFFICE VISIT (OUTPATIENT)
Dept: SLEEP MEDICINE | Facility: CLINIC | Age: 68
End: 2023-10-30
Payer: MEDICARE

## 2023-10-30 VITALS
BODY MASS INDEX: 44.6 KG/M2 | OXYGEN SATURATION: 94 % | DIASTOLIC BLOOD PRESSURE: 76 MMHG | TEMPERATURE: 97.7 F | WEIGHT: 251.8 LBS | HEART RATE: 93 BPM | SYSTOLIC BLOOD PRESSURE: 122 MMHG

## 2023-10-30 DIAGNOSIS — G47.33 OSA (OBSTRUCTIVE SLEEP APNEA): Primary | ICD-10-CM

## 2023-10-30 PROCEDURE — 3078F DIAST BP <80 MM HG: CPT | Performed by: NURSE PRACTITIONER

## 2023-10-30 PROCEDURE — 3074F SYST BP LT 130 MM HG: CPT | Performed by: NURSE PRACTITIONER

## 2023-10-30 PROCEDURE — 99213 OFFICE O/P EST LOW 20 MIN: CPT | Performed by: NURSE PRACTITIONER

## 2023-10-30 NOTE — PROGRESS NOTES
Chief Complaint:   Chief Complaint   Patient presents with    Follow-up       HPI:    Masha Forde is a 68 y.o. female here for follow-up of sleep apnea.  Patient was last seen 10/24/2022.  Patient continues to do well with CPAP therapy.  Patient is sleeping anywhere between 6 and 7 hours nightly and does feel rested upon awakening.  Patient will go to sleep quickly and will get up occasionally to use the restroom.  Patient has an [place] score of 3/24.  Patient has no concerns or complaints and will continue therapy.        Current medications are:   Current Outpatient Medications:     Semaglutide,0.25 or 0.5MG/DOS, (OZEMPIC) 2 MG/1.5ML solution pen-injector, Inject 0.25 mg under the skin into the appropriate area as directed 1 (One) Time Per Week., Disp: , Rfl: .      The patient's relevant past medical, surgical, family and social history were reviewed and updated in Epic as appropriate.       Review of Systems   HENT:  Positive for congestion.    Respiratory:  Positive for apnea.    Psychiatric/Behavioral:  Positive for sleep disturbance.    All other systems reviewed and are negative.        Objective:    Physical Exam  Constitutional:       Appearance: Normal appearance.   HENT:      Head: Normocephalic and atraumatic.      Mouth/Throat:      Comments: Class 3 airway  Cardiovascular:      Rate and Rhythm: Normal rate and regular rhythm.   Pulmonary:      Effort: Pulmonary effort is normal.      Breath sounds: Normal breath sounds.   Skin:     General: Skin is warm and dry.   Neurological:      Mental Status: She is alert and oriented to person, place, and time.   Psychiatric:         Mood and Affect: Mood normal.         Behavior: Behavior normal.         Thought Content: Thought content normal.         Judgment: Judgment normal.         CPAP Report    Average use 6 hours 38 minutes  66 over 68 days of use  Greater than 4-hour use 89.7  Setting 8-18  AHI of 3  The patient continues to use and benefit from  CPAP therapy.    ASSESSMENT/PLAN    Diagnoses and all orders for this visit:    1. JEANNETTE (obstructive sleep apnea) (Primary)  -     PAP Therapy        Counseled patient regarding multimodal approach with healthy nutrition, healthy sleep, regular physical activity, social activities, counseling, and medications. Encouraged to practice lateral sleep position. Avoid alcohol and sedatives close to bedtime.    Refill supplies x1 year.  Return to clinic 1 year sooner symptoms warrant.    I have reviewed the results of my evaluation and impression and discussed my recommendations in detail with the patient.      Signed by  MIGUE Monet    October 30, 2023      CC: Triston Arzola MD         No ref. provider found

## 2024-09-30 NOTE — TELEPHONE ENCOUNTER
Pt is aware of rx being sent in. Pt states that she will stop by today to pick it up.   Quality 226: Preventive Care And Screening: Tobacco Use: Screening And Cessation Intervention: Patient screened for tobacco use and is an ex/non-smoker Detail Level: Detailed Quality 431: Preventive Care And Screening: Unhealthy Alcohol Use - Screening: Patient not identified as an unhealthy alcohol user when screened for unhealthy alcohol use using a systematic screening method Quality 130: Documentation Of Current Medications In The Medical Record: Current Medications Documented

## 2024-10-31 ENCOUNTER — OFFICE VISIT (OUTPATIENT)
Dept: SLEEP MEDICINE | Facility: CLINIC | Age: 69
End: 2024-10-31
Payer: MEDICARE

## 2024-10-31 VITALS
DIASTOLIC BLOOD PRESSURE: 74 MMHG | HEIGHT: 63 IN | OXYGEN SATURATION: 99 % | SYSTOLIC BLOOD PRESSURE: 130 MMHG | HEART RATE: 82 BPM | TEMPERATURE: 97.9 F | BODY MASS INDEX: 41.82 KG/M2 | WEIGHT: 236 LBS

## 2024-10-31 DIAGNOSIS — G47.33 OSA (OBSTRUCTIVE SLEEP APNEA): Primary | ICD-10-CM

## 2024-10-31 PROCEDURE — 3078F DIAST BP <80 MM HG: CPT | Performed by: NURSE PRACTITIONER

## 2024-10-31 PROCEDURE — 3075F SYST BP GE 130 - 139MM HG: CPT | Performed by: NURSE PRACTITIONER

## 2024-10-31 PROCEDURE — 99213 OFFICE O/P EST LOW 20 MIN: CPT | Performed by: NURSE PRACTITIONER

## 2024-10-31 RX ORDER — ETODOLAC 500 MG/1
TABLET, FILM COATED ORAL SEE ADMIN INSTRUCTIONS
COMMUNITY
Start: 2024-08-15

## 2024-10-31 RX ORDER — ATORVASTATIN CALCIUM 10 MG/1
TABLET, FILM COATED ORAL
COMMUNITY
Start: 2024-07-15

## 2024-10-31 RX ORDER — LOSARTAN POTASSIUM 100 MG/1
TABLET ORAL
COMMUNITY
Start: 2024-10-28

## 2024-10-31 RX ORDER — METFORMIN HYDROCHLORIDE 500 MG/1
TABLET, EXTENDED RELEASE ORAL
COMMUNITY
Start: 2024-07-15

## 2024-10-31 RX ORDER — INSULIN LISPRO 100 [IU]/ML
INJECTION, SUSPENSION SUBCUTANEOUS
COMMUNITY
Start: 2024-10-22

## 2024-10-31 NOTE — PROGRESS NOTES
"Chief Complaint:   Chief Complaint   Patient presents with    Follow-up    Sleep Apnea       HPI:    Masha Forde is a 69 y.o. female here for follow-up of sleep apnea.  Patient was last seen 10/30/2023.  Patient continues to do well with CPAP therapy.  Patient is sleeping 6 to 7 hours nightly and feels rested upon awakening.  Patient goes to sleep quickly will occasionally get up x 1.  Patient has an [place] score of 4/24.  Patient does well with nasal mask and heated tubing.  Patient has no concerns or complaints and will continue therapy.        Current medications are:   Current Outpatient Medications:     atorvastatin (LIPITOR) 10 MG tablet, , Disp: , Rfl:     Insulin Lispro Prot & Lispro (humaLOG 75-25) (75-25) 100 UNIT/ML suspension pen-injector pen, , Disp: , Rfl:     losartan (COZAAR) 100 MG tablet, , Disp: , Rfl:     metFORMIN ER (GLUCOPHAGE-XR) 500 MG 24 hr tablet, , Disp: , Rfl:     Semaglutide,0.25 or 0.5MG/DOS, (OZEMPIC) 2 MG/1.5ML solution pen-injector, Inject 0.25 mg under the skin into the appropriate area as directed 1 (One) Time Per Week., Disp: , Rfl:     Sure Comfort Insulin Syringe 31G X 5/16\" 0.5 ML misc, See Admin Instructions., Disp: , Rfl:     Tirzepatide-Weight Management (ZEPBOUND) 10 MG/0.5ML solution auto-injector, Inject 0.5 mL under the skin into the appropriate area as directed 1 (One) Time Per Week., Disp: , Rfl: .      The patient's relevant past medical, surgical, family and social history were reviewed and updated in Epic as appropriate.       Review of Systems   HENT:  Positive for congestion.    Eyes:  Positive for visual disturbance.   Respiratory:  Positive for apnea.    Gastrointestinal:         Heartburn   Endocrine: Positive for cold intolerance.   Psychiatric/Behavioral:  Positive for sleep disturbance.    All other systems reviewed and are negative.        Objective:    Physical Exam  Constitutional:       Appearance: Normal appearance.   HENT:      Head: " "Normocephalic and atraumatic.      Mouth/Throat:      Comments: Class 3 airway  Cardiovascular:      Rate and Rhythm: Normal rate and regular rhythm.   Pulmonary:      Effort: Pulmonary effort is normal.      Breath sounds: Normal breath sounds.   Skin:     General: Skin is warm and dry.   Neurological:      Mental Status: She is alert and oriented to person, place, and time.   Psychiatric:         Mood and Affect: Mood normal.         Behavior: Behavior normal.         Thought Content: Thought content normal.         Judgment: Judgment normal.     /74   Pulse 82   Temp 97.9 °F (36.6 °C)   Ht 160 cm (62.99\")   Wt 107 kg (236 lb)   SpO2 99%   BMI 41.82 kg/m²       CPAP Report    78/90 days of use  Greater than 4-hour use 76.7  AHI of 2.4  90% pressure 9 cmH2O  Setting 8-18  The patient continues to use and benefit from CPAP therapy.    ASSESSMENT/PLAN    Diagnoses and all orders for this visit:    1. JEANNETTE (obstructive sleep apnea) (Primary)  -     PAP Therapy    Refill supplies x 1 year.  Return to clinic 1 year sooner symptoms warrant.          Signed by  MIGUE Monet    October 31, 2024      CC: Triston Arzola MD         No ref. provider found      "